# Patient Record
Sex: FEMALE | Race: WHITE | NOT HISPANIC OR LATINO | Employment: UNEMPLOYED | URBAN - METROPOLITAN AREA
[De-identification: names, ages, dates, MRNs, and addresses within clinical notes are randomized per-mention and may not be internally consistent; named-entity substitution may affect disease eponyms.]

---

## 2016-09-14 LAB
EXTERNAL ABO GROUPING: NORMAL
EXTERNAL ANTIBODY SCREEN: NEGATIVE
EXTERNAL HEPATITIS B SURFACE ANTIGEN: NEGATIVE
EXTERNAL HEPATITIS C AB: NORMAL
EXTERNAL RH FACTOR: POSITIVE
EXTERNAL RUBELLA QUALITATIVE: NORMAL
EXTERNAL SYPHILIS RPR SCREEN: NORMAL
HIV1 AB SPEC QL IA.RAPID: NEGATIVE

## 2017-03-27 LAB — EXTERNAL GROUP B STREP ANTIGEN: NORMAL

## 2017-04-28 ENCOUNTER — HOSPITAL ENCOUNTER (OUTPATIENT)
Facility: HOSPITAL | Age: 32
Setting detail: OBSERVATION
Discharge: HOME | End: 2017-04-28
Attending: OBSTETRICS & GYNECOLOGY | Admitting: OBSTETRICS & GYNECOLOGY

## 2017-04-28 VITALS
HEIGHT: 65 IN | DIASTOLIC BLOOD PRESSURE: 60 MMHG | SYSTOLIC BLOOD PRESSURE: 103 MMHG | TEMPERATURE: 98.3 F | BODY MASS INDEX: 31.32 KG/M2 | WEIGHT: 188 LBS | HEART RATE: 66 BPM

## 2017-04-28 PROBLEM — Z34.90 PREGNANCY: Status: ACTIVE | Noted: 2017-04-28

## 2017-04-28 LAB
EXPIRATION DATE: ABNORMAL
Lab: ABNORMAL
PROT UR STRIP-MCNC: ABNORMAL MG/DL

## 2017-04-28 PROCEDURE — G0378 HOSPITAL OBSERVATION PER HR: HCPCS

## 2017-04-28 PROCEDURE — 59025 FETAL NON-STRESS TEST: CPT

## 2017-04-29 ENCOUNTER — HOSPITAL ENCOUNTER (INPATIENT)
Facility: HOSPITAL | Age: 32
LOS: 3 days | Discharge: HOME | End: 2017-05-02
Attending: OBSTETRICS & GYNECOLOGY | Admitting: OBSTETRICS & GYNECOLOGY

## 2017-04-29 LAB
ABO GROUP BLD: NORMAL
BASOPHILS # BLD AUTO: 0.01 10*3/MM3 (ref 0–0.2)
BASOPHILS NFR BLD AUTO: 0.1 % (ref 0–1.5)
BLD GP AB SCN SERPL QL: NEGATIVE
DEPRECATED RDW RBC AUTO: 45.1 FL (ref 37–54)
EOSINOPHIL # BLD AUTO: 0.06 10*3/MM3 (ref 0–0.7)
EOSINOPHIL NFR BLD AUTO: 0.6 % (ref 0.3–6.2)
ERYTHROCYTE [DISTWIDTH] IN BLOOD BY AUTOMATED COUNT: 13.8 % (ref 11.7–13)
EXPIRATION DATE: NORMAL
HCT VFR BLD AUTO: 34.9 % (ref 35.6–45.5)
HGB BLD-MCNC: 11.9 G/DL (ref 11.9–15.5)
IMM GRANULOCYTES # BLD: 0.07 10*3/MM3 (ref 0–0.03)
IMM GRANULOCYTES NFR BLD: 0.8 % (ref 0–0.5)
LYMPHOCYTES # BLD AUTO: 2.33 10*3/MM3 (ref 0.9–4.8)
LYMPHOCYTES NFR BLD AUTO: 25.2 % (ref 19.6–45.3)
Lab: NORMAL
MCH RBC QN AUTO: 30.7 PG (ref 26.9–32)
MCHC RBC AUTO-ENTMCNC: 34.1 G/DL (ref 32.4–36.3)
MCV RBC AUTO: 89.9 FL (ref 80.5–98.2)
MONOCYTES # BLD AUTO: 0.51 10*3/MM3 (ref 0.2–1.2)
MONOCYTES NFR BLD AUTO: 5.5 % (ref 5–12)
NEUTROPHILS # BLD AUTO: 6.28 10*3/MM3 (ref 1.9–8.1)
NEUTROPHILS NFR BLD AUTO: 67.8 % (ref 42.7–76)
PLATELET # BLD AUTO: 165 10*3/MM3 (ref 140–500)
PMV BLD AUTO: 9.5 FL (ref 6–12)
PROT UR STRIP-MCNC: NORMAL MG/DL
RBC # BLD AUTO: 3.88 10*6/MM3 (ref 3.9–5.2)
RH BLD: POSITIVE
WBC NRBC COR # BLD: 9.26 10*3/MM3 (ref 4.5–10.7)

## 2017-04-29 PROCEDURE — 3E0P7GC INTRODUCTION OF OTHER THERAPEUTIC SUBSTANCE INTO FEMALE REPRODUCTIVE, VIA NATURAL OR ARTIFICIAL OPENING: ICD-10-PCS | Performed by: OBSTETRICS & GYNECOLOGY

## 2017-04-29 PROCEDURE — 85025 COMPLETE CBC W/AUTO DIFF WBC: CPT | Performed by: OBSTETRICS & GYNECOLOGY

## 2017-04-29 PROCEDURE — 86850 RBC ANTIBODY SCREEN: CPT | Performed by: OBSTETRICS & GYNECOLOGY

## 2017-04-29 PROCEDURE — 86901 BLOOD TYPING SEROLOGIC RH(D): CPT | Performed by: OBSTETRICS & GYNECOLOGY

## 2017-04-29 PROCEDURE — 63710000001 DIPHENHYDRAMINE PER 50 MG: Performed by: OBSTETRICS & GYNECOLOGY

## 2017-04-29 PROCEDURE — 86900 BLOOD TYPING SEROLOGIC ABO: CPT | Performed by: OBSTETRICS & GYNECOLOGY

## 2017-04-29 RX ORDER — OXYCODONE HYDROCHLORIDE AND ACETAMINOPHEN 5; 325 MG/1; MG/1
1 TABLET ORAL EVERY 4 HOURS PRN
Status: DISCONTINUED | OUTPATIENT
Start: 2017-04-29 | End: 2017-05-01 | Stop reason: HOSPADM

## 2017-04-29 RX ORDER — TERBUTALINE SULFATE 1 MG/ML
0.25 INJECTION, SOLUTION SUBCUTANEOUS AS NEEDED
Status: DISCONTINUED | OUTPATIENT
Start: 2017-04-29 | End: 2017-05-01 | Stop reason: HOSPADM

## 2017-04-29 RX ORDER — OXYCODONE HYDROCHLORIDE AND ACETAMINOPHEN 5; 325 MG/1; MG/1
2 TABLET ORAL EVERY 4 HOURS PRN
Status: DISCONTINUED | OUTPATIENT
Start: 2017-04-29 | End: 2017-05-01 | Stop reason: HOSPADM

## 2017-04-29 RX ORDER — ONDANSETRON 4 MG/1
4 TABLET, FILM COATED ORAL EVERY 6 HOURS PRN
Status: DISCONTINUED | OUTPATIENT
Start: 2017-04-29 | End: 2017-05-01 | Stop reason: HOSPADM

## 2017-04-29 RX ORDER — ACETAMINOPHEN 500 MG
1000 TABLET ORAL EVERY 4 HOURS PRN
Status: DISCONTINUED | OUTPATIENT
Start: 2017-04-29 | End: 2017-05-01 | Stop reason: HOSPADM

## 2017-04-29 RX ORDER — FAMOTIDINE 20 MG/1
20 TABLET, FILM COATED ORAL 2 TIMES DAILY
Status: DISCONTINUED | OUTPATIENT
Start: 2017-04-29 | End: 2017-05-01 | Stop reason: HOSPADM

## 2017-04-29 RX ORDER — LIDOCAINE HYDROCHLORIDE 10 MG/ML
5 INJECTION, SOLUTION INFILTRATION; PERINEURAL AS NEEDED
Status: DISCONTINUED | OUTPATIENT
Start: 2017-04-29 | End: 2017-05-01 | Stop reason: HOSPADM

## 2017-04-29 RX ORDER — FAMOTIDINE 10 MG/ML
20 INJECTION, SOLUTION INTRAVENOUS 2 TIMES DAILY
Status: DISCONTINUED | OUTPATIENT
Start: 2017-04-29 | End: 2017-05-01 | Stop reason: HOSPADM

## 2017-04-29 RX ORDER — ZOLPIDEM TARTRATE 5 MG/1
5 TABLET ORAL NIGHTLY PRN
Status: DISCONTINUED | OUTPATIENT
Start: 2017-04-29 | End: 2017-05-01 | Stop reason: HOSPADM

## 2017-04-29 RX ORDER — ONDANSETRON 2 MG/ML
4 INJECTION INTRAMUSCULAR; INTRAVENOUS EVERY 6 HOURS PRN
Status: DISCONTINUED | OUTPATIENT
Start: 2017-04-29 | End: 2017-05-01 | Stop reason: HOSPADM

## 2017-04-29 RX ORDER — SODIUM CHLORIDE, SODIUM LACTATE, POTASSIUM CHLORIDE, CALCIUM CHLORIDE 600; 310; 30; 20 MG/100ML; MG/100ML; MG/100ML; MG/100ML
125 INJECTION, SOLUTION INTRAVENOUS CONTINUOUS
Status: DISCONTINUED | OUTPATIENT
Start: 2017-04-29 | End: 2017-05-01

## 2017-04-29 RX ORDER — DIPHENHYDRAMINE HCL 25 MG
25 CAPSULE ORAL NIGHTLY PRN
Status: DISCONTINUED | OUTPATIENT
Start: 2017-04-29 | End: 2017-05-01 | Stop reason: HOSPADM

## 2017-04-29 RX ORDER — DIPHENHYDRAMINE HYDROCHLORIDE 50 MG/ML
25 INJECTION INTRAMUSCULAR; INTRAVENOUS NIGHTLY PRN
Status: DISCONTINUED | OUTPATIENT
Start: 2017-04-29 | End: 2017-05-01 | Stop reason: HOSPADM

## 2017-04-29 RX ORDER — ONDANSETRON 4 MG/1
4 TABLET, ORALLY DISINTEGRATING ORAL EVERY 6 HOURS PRN
Status: DISCONTINUED | OUTPATIENT
Start: 2017-04-29 | End: 2017-05-01 | Stop reason: HOSPADM

## 2017-04-29 RX ORDER — SODIUM CHLORIDE 0.9 % (FLUSH) 0.9 %
1-10 SYRINGE (ML) INJECTION AS NEEDED
Status: DISCONTINUED | OUTPATIENT
Start: 2017-04-29 | End: 2017-05-01 | Stop reason: HOSPADM

## 2017-04-29 RX ADMIN — DIPHENHYDRAMINE HYDROCHLORIDE 25 MG: 25 CAPSULE ORAL at 23:21

## 2017-04-29 RX ADMIN — FAMOTIDINE 20 MG: 20 TABLET, FILM COATED ORAL at 23:21

## 2017-04-29 RX ADMIN — DINOPROSTONE 10 MG: 10 INSERT VAGINAL at 21:20

## 2017-04-30 ENCOUNTER — ANESTHESIA (OUTPATIENT)
Dept: LABOR AND DELIVERY | Facility: HOSPITAL | Age: 32
End: 2017-04-30

## 2017-04-30 ENCOUNTER — ANESTHESIA EVENT (OUTPATIENT)
Dept: LABOR AND DELIVERY | Facility: HOSPITAL | Age: 32
End: 2017-04-30

## 2017-04-30 PROCEDURE — 25010000002 ROPIVACAINE PER 1 MG

## 2017-04-30 PROCEDURE — C1755 CATHETER, INTRASPINAL: HCPCS | Performed by: ANESTHESIOLOGY

## 2017-04-30 PROCEDURE — 3E033VJ INTRODUCTION OF OTHER HORMONE INTO PERIPHERAL VEIN, PERCUTANEOUS APPROACH: ICD-10-PCS | Performed by: OBSTETRICS & GYNECOLOGY

## 2017-04-30 PROCEDURE — 10907ZC DRAINAGE OF AMNIOTIC FLUID, THERAPEUTIC FROM PRODUCTS OF CONCEPTION, VIA NATURAL OR ARTIFICIAL OPENING: ICD-10-PCS | Performed by: OBSTETRICS & GYNECOLOGY

## 2017-04-30 PROCEDURE — C1755 CATHETER, INTRASPINAL: HCPCS

## 2017-04-30 RX ORDER — ROPIVACAINE HYDROCHLORIDE 2 MG/ML
INJECTION, SOLUTION EPIDURAL; INFILTRATION; PERINEURAL
Status: COMPLETED
Start: 2017-04-30 | End: 2017-04-30

## 2017-04-30 RX ORDER — CARBOPROST TROMETHAMINE 250 UG/ML
250 INJECTION, SOLUTION INTRAMUSCULAR AS NEEDED
Status: DISCONTINUED | OUTPATIENT
Start: 2017-04-30 | End: 2017-05-01 | Stop reason: HOSPADM

## 2017-04-30 RX ORDER — MISOPROSTOL 200 UG/1
800 TABLET ORAL AS NEEDED
Status: DISCONTINUED | OUTPATIENT
Start: 2017-04-30 | End: 2017-05-01 | Stop reason: HOSPADM

## 2017-04-30 RX ORDER — OXYTOCIN/RINGER'S LACTATE 10/500ML
125 PLASTIC BAG, INJECTION (ML) INTRAVENOUS CONTINUOUS PRN
Status: DISCONTINUED | OUTPATIENT
Start: 2017-04-30 | End: 2017-05-01 | Stop reason: HOSPADM

## 2017-04-30 RX ORDER — OXYTOCIN/RINGER'S LACTATE 10/500ML
2 PLASTIC BAG, INJECTION (ML) INTRAVENOUS
Status: DISCONTINUED | OUTPATIENT
Start: 2017-04-30 | End: 2017-05-01

## 2017-04-30 RX ORDER — OXYTOCIN/RINGER'S LACTATE 10/500ML
999 PLASTIC BAG, INJECTION (ML) INTRAVENOUS ONCE
Status: DISCONTINUED | OUTPATIENT
Start: 2017-04-30 | End: 2017-05-01 | Stop reason: HOSPADM

## 2017-04-30 RX ORDER — DIPHENHYDRAMINE HYDROCHLORIDE 50 MG/ML
12.5 INJECTION INTRAMUSCULAR; INTRAVENOUS EVERY 8 HOURS PRN
Status: DISCONTINUED | OUTPATIENT
Start: 2017-04-30 | End: 2017-05-01 | Stop reason: HOSPADM

## 2017-04-30 RX ORDER — ONDANSETRON 2 MG/ML
4 INJECTION INTRAMUSCULAR; INTRAVENOUS ONCE AS NEEDED
Status: DISCONTINUED | OUTPATIENT
Start: 2017-04-30 | End: 2017-05-01 | Stop reason: HOSPADM

## 2017-04-30 RX ORDER — FAMOTIDINE 10 MG/ML
20 INJECTION, SOLUTION INTRAVENOUS ONCE AS NEEDED
Status: DISCONTINUED | OUTPATIENT
Start: 2017-04-30 | End: 2017-05-01 | Stop reason: HOSPADM

## 2017-04-30 RX ADMIN — ROPIVACAINE HYDROCHLORIDE 5 ML/HR: 2 INJECTION, SOLUTION EPIDURAL; INFILTRATION at 20:27

## 2017-04-30 RX ADMIN — SODIUM CHLORIDE, POTASSIUM CHLORIDE, SODIUM LACTATE AND CALCIUM CHLORIDE 125 ML/HR: 600; 310; 30; 20 INJECTION, SOLUTION INTRAVENOUS at 21:00

## 2017-04-30 RX ADMIN — SODIUM CHLORIDE, POTASSIUM CHLORIDE, SODIUM LACTATE AND CALCIUM CHLORIDE 1000 ML: 600; 310; 30; 20 INJECTION, SOLUTION INTRAVENOUS at 20:00

## 2017-04-30 RX ADMIN — OXYTOCIN 1 MILLI-UNITS/MIN: 10 INJECTION, SOLUTION INTRAMUSCULAR; INTRAVENOUS at 23:45

## 2017-05-01 PROBLEM — Z34.90 PREGNANCY: Status: RESOLVED | Noted: 2017-04-28 | Resolved: 2017-05-01

## 2017-05-01 PROCEDURE — 0HQ9XZZ REPAIR PERINEUM SKIN, EXTERNAL APPROACH: ICD-10-PCS | Performed by: OBSTETRICS & GYNECOLOGY

## 2017-05-01 RX ORDER — ONDANSETRON 2 MG/ML
4 INJECTION INTRAMUSCULAR; INTRAVENOUS EVERY 6 HOURS PRN
Status: DISCONTINUED | OUTPATIENT
Start: 2017-05-01 | End: 2017-05-02 | Stop reason: HOSPADM

## 2017-05-01 RX ORDER — ONDANSETRON 4 MG/1
4 TABLET, ORALLY DISINTEGRATING ORAL EVERY 6 HOURS PRN
Status: DISCONTINUED | OUTPATIENT
Start: 2017-05-01 | End: 2017-05-02 | Stop reason: HOSPADM

## 2017-05-01 RX ORDER — METHYLERGONOVINE MALEATE 0.2 MG/ML
200 INJECTION INTRAVENOUS AS NEEDED
Status: DISCONTINUED | OUTPATIENT
Start: 2017-05-01 | End: 2017-05-02 | Stop reason: HOSPADM

## 2017-05-01 RX ORDER — OXYCODONE HYDROCHLORIDE AND ACETAMINOPHEN 5; 325 MG/1; MG/1
2 TABLET ORAL EVERY 4 HOURS PRN
Status: DISCONTINUED | OUTPATIENT
Start: 2017-05-01 | End: 2017-05-02 | Stop reason: HOSPADM

## 2017-05-01 RX ORDER — ZOLPIDEM TARTRATE 5 MG/1
5 TABLET ORAL NIGHTLY PRN
Status: DISCONTINUED | OUTPATIENT
Start: 2017-05-01 | End: 2017-05-02 | Stop reason: HOSPADM

## 2017-05-01 RX ORDER — IBUPROFEN 800 MG/1
800 TABLET ORAL EVERY 8 HOURS
Status: DISCONTINUED | OUTPATIENT
Start: 2017-05-01 | End: 2017-05-02 | Stop reason: HOSPADM

## 2017-05-01 RX ORDER — OXYTOCIN/RINGER'S LACTATE 10/500ML
999 PLASTIC BAG, INJECTION (ML) INTRAVENOUS ONCE
Status: DISCONTINUED | OUTPATIENT
Start: 2017-05-01 | End: 2017-05-02 | Stop reason: HOSPADM

## 2017-05-01 RX ORDER — PROMETHAZINE HYDROCHLORIDE 25 MG/ML
12.5 INJECTION, SOLUTION INTRAMUSCULAR; INTRAVENOUS EVERY 6 HOURS PRN
Status: DISCONTINUED | OUTPATIENT
Start: 2017-05-01 | End: 2017-05-02 | Stop reason: HOSPADM

## 2017-05-01 RX ORDER — ONDANSETRON 4 MG/1
4 TABLET, FILM COATED ORAL EVERY 6 HOURS PRN
Status: DISCONTINUED | OUTPATIENT
Start: 2017-05-01 | End: 2017-05-02 | Stop reason: HOSPADM

## 2017-05-01 RX ORDER — MINERAL OIL
OIL (ML) MISCELLANEOUS
Status: COMPLETED
Start: 2017-05-01 | End: 2017-05-01

## 2017-05-01 RX ORDER — MINERAL OIL
30 OIL (ML) MISCELLANEOUS AS NEEDED
Status: DISCONTINUED | OUTPATIENT
Start: 2017-05-01 | End: 2017-05-01 | Stop reason: HOSPADM

## 2017-05-01 RX ORDER — OXYTOCIN/RINGER'S LACTATE 10/500ML
125 PLASTIC BAG, INJECTION (ML) INTRAVENOUS CONTINUOUS PRN
Status: ACTIVE | OUTPATIENT
Start: 2017-05-01 | End: 2017-05-02

## 2017-05-01 RX ORDER — PROMETHAZINE HYDROCHLORIDE 25 MG/ML
12.5 INJECTION, SOLUTION INTRAMUSCULAR; INTRAVENOUS EVERY 4 HOURS PRN
Status: DISCONTINUED | OUTPATIENT
Start: 2017-05-01 | End: 2017-05-02 | Stop reason: HOSPADM

## 2017-05-01 RX ORDER — OXYCODONE HYDROCHLORIDE AND ACETAMINOPHEN 5; 325 MG/1; MG/1
1 TABLET ORAL EVERY 4 HOURS PRN
Status: DISCONTINUED | OUTPATIENT
Start: 2017-05-01 | End: 2017-05-02 | Stop reason: HOSPADM

## 2017-05-01 RX ORDER — ACETAMINOPHEN 325 MG/1
650 TABLET ORAL EVERY 4 HOURS PRN
Status: DISCONTINUED | OUTPATIENT
Start: 2017-05-01 | End: 2017-05-02 | Stop reason: HOSPADM

## 2017-05-01 RX ORDER — LANOLIN 100 %
OINTMENT (GRAM) TOPICAL
Status: DISCONTINUED | OUTPATIENT
Start: 2017-05-01 | End: 2017-05-02 | Stop reason: HOSPADM

## 2017-05-01 RX ORDER — ERYTHROMYCIN 5 MG/G
OINTMENT OPHTHALMIC
Status: DISPENSED
Start: 2017-05-01 | End: 2017-05-01

## 2017-05-01 RX ORDER — PROMETHAZINE HYDROCHLORIDE 25 MG/1
25 TABLET ORAL EVERY 6 HOURS PRN
Status: DISCONTINUED | OUTPATIENT
Start: 2017-05-01 | End: 2017-05-02 | Stop reason: HOSPADM

## 2017-05-01 RX ORDER — NALBUPHINE HCL 10 MG/ML
5 AMPUL (ML) INJECTION
Status: DISCONTINUED | OUTPATIENT
Start: 2017-05-01 | End: 2017-05-02 | Stop reason: HOSPADM

## 2017-05-01 RX ORDER — PHYTONADIONE 1 MG/.5ML
INJECTION, EMULSION INTRAMUSCULAR; INTRAVENOUS; SUBCUTANEOUS
Status: DISPENSED
Start: 2017-05-01 | End: 2017-05-01

## 2017-05-01 RX ORDER — PROMETHAZINE HYDROCHLORIDE 25 MG/1
12.5 TABLET ORAL EVERY 6 HOURS PRN
Status: DISCONTINUED | OUTPATIENT
Start: 2017-05-01 | End: 2017-05-02 | Stop reason: HOSPADM

## 2017-05-01 RX ORDER — BISACODYL 10 MG
10 SUPPOSITORY, RECTAL RECTAL DAILY PRN
Status: DISCONTINUED | OUTPATIENT
Start: 2017-05-02 | End: 2017-05-02 | Stop reason: HOSPADM

## 2017-05-01 RX ORDER — OXYCODONE HYDROCHLORIDE AND ACETAMINOPHEN 5; 325 MG/1; MG/1
2 TABLET ORAL
Status: DISCONTINUED | OUTPATIENT
Start: 2017-05-01 | End: 2017-05-02 | Stop reason: HOSPADM

## 2017-05-01 RX ORDER — DIPHENHYDRAMINE HCL 25 MG
25 CAPSULE ORAL NIGHTLY PRN
Status: DISCONTINUED | OUTPATIENT
Start: 2017-05-01 | End: 2017-05-02 | Stop reason: HOSPADM

## 2017-05-01 RX ORDER — ACETAMINOPHEN 500 MG
1000 TABLET ORAL EVERY 4 HOURS PRN
Status: DISCONTINUED | OUTPATIENT
Start: 2017-05-01 | End: 2017-05-02 | Stop reason: HOSPADM

## 2017-05-01 RX ORDER — SODIUM CHLORIDE 0.9 % (FLUSH) 0.9 %
1-10 SYRINGE (ML) INJECTION AS NEEDED
Status: DISCONTINUED | OUTPATIENT
Start: 2017-05-01 | End: 2017-05-02 | Stop reason: HOSPADM

## 2017-05-01 RX ORDER — ACETAMINOPHEN 650 MG/1
650 SUPPOSITORY RECTAL EVERY 4 HOURS PRN
Status: DISCONTINUED | OUTPATIENT
Start: 2017-05-01 | End: 2017-05-02 | Stop reason: HOSPADM

## 2017-05-01 RX ORDER — OXYCODONE HYDROCHLORIDE AND ACETAMINOPHEN 5; 325 MG/1; MG/1
1 TABLET ORAL
Status: DISCONTINUED | OUTPATIENT
Start: 2017-05-01 | End: 2017-05-02 | Stop reason: HOSPADM

## 2017-05-01 RX ORDER — DOCUSATE SODIUM 100 MG/1
100 CAPSULE, LIQUID FILLED ORAL 2 TIMES DAILY
Status: DISCONTINUED | OUTPATIENT
Start: 2017-05-01 | End: 2017-05-02 | Stop reason: HOSPADM

## 2017-05-01 RX ORDER — PROMETHAZINE HYDROCHLORIDE 12.5 MG/1
12.5 SUPPOSITORY RECTAL EVERY 6 HOURS PRN
Status: DISCONTINUED | OUTPATIENT
Start: 2017-05-01 | End: 2017-05-02 | Stop reason: HOSPADM

## 2017-05-01 RX ORDER — IBUPROFEN 600 MG/1
600 TABLET ORAL EVERY 6 HOURS PRN
Status: DISCONTINUED | OUTPATIENT
Start: 2017-05-01 | End: 2017-05-02 | Stop reason: HOSPADM

## 2017-05-01 RX ORDER — DIPHENHYDRAMINE HYDROCHLORIDE 50 MG/ML
25 INJECTION INTRAMUSCULAR; INTRAVENOUS NIGHTLY PRN
Status: DISCONTINUED | OUTPATIENT
Start: 2017-05-01 | End: 2017-05-02 | Stop reason: HOSPADM

## 2017-05-01 RX ADMIN — Medication 10 ML: at 03:16

## 2017-05-01 RX ADMIN — DOCUSATE SODIUM 100 MG: 100 CAPSULE, LIQUID FILLED ORAL at 18:44

## 2017-05-01 RX ADMIN — IBUPROFEN 800 MG: 800 TABLET, FILM COATED ORAL at 06:46

## 2017-05-01 RX ADMIN — IBUPROFEN 800 MG: 800 TABLET, FILM COATED ORAL at 14:42

## 2017-05-01 RX ADMIN — EPHEDRINE SULFATE 5 MG: 50 INJECTION INTRAMUSCULAR; INTRAVENOUS; SUBCUTANEOUS at 00:25

## 2017-05-01 RX ADMIN — LIDOCAINE HYDROCHLORIDE 30 ML: 20 JELLY TOPICAL at 03:17

## 2017-05-01 RX ADMIN — OXYTOCIN 125 ML/HR: 10 INJECTION, SOLUTION INTRAMUSCULAR; INTRAVENOUS at 03:50

## 2017-05-01 RX ADMIN — OXYCODONE HYDROCHLORIDE AND ACETAMINOPHEN 1 TABLET: 5; 325 TABLET ORAL at 06:46

## 2017-05-01 RX ADMIN — IBUPROFEN 600 MG: 600 TABLET ORAL at 22:43

## 2017-05-01 RX ADMIN — DOCUSATE SODIUM 100 MG: 100 CAPSULE, LIQUID FILLED ORAL at 08:00

## 2017-05-01 RX ADMIN — BENZOCAINE AND MENTHOL: 20; .5 SPRAY TOPICAL at 08:00

## 2017-05-02 VITALS
BODY MASS INDEX: 31.56 KG/M2 | OXYGEN SATURATION: 99 % | HEART RATE: 76 BPM | WEIGHT: 189.4 LBS | TEMPERATURE: 97.6 F | SYSTOLIC BLOOD PRESSURE: 111 MMHG | RESPIRATION RATE: 16 BRPM | DIASTOLIC BLOOD PRESSURE: 68 MMHG | HEIGHT: 65 IN

## 2017-05-02 LAB
BASOPHILS # BLD AUTO: 0.02 10*3/MM3 (ref 0–0.2)
BASOPHILS NFR BLD AUTO: 0.2 % (ref 0–1.5)
DEPRECATED RDW RBC AUTO: 47 FL (ref 37–54)
EOSINOPHIL # BLD AUTO: 0.14 10*3/MM3 (ref 0–0.7)
EOSINOPHIL NFR BLD AUTO: 1.2 % (ref 0.3–6.2)
ERYTHROCYTE [DISTWIDTH] IN BLOOD BY AUTOMATED COUNT: 13.9 % (ref 11.7–13)
HCT VFR BLD AUTO: 30.9 % (ref 35.6–45.5)
HGB BLD-MCNC: 10.3 G/DL (ref 11.9–15.5)
IMM GRANULOCYTES # BLD: 0.07 10*3/MM3 (ref 0–0.03)
IMM GRANULOCYTES NFR BLD: 0.6 % (ref 0–0.5)
LYMPHOCYTES # BLD AUTO: 3.31 10*3/MM3 (ref 0.9–4.8)
LYMPHOCYTES NFR BLD AUTO: 28.2 % (ref 19.6–45.3)
MCH RBC QN AUTO: 31.1 PG (ref 26.9–32)
MCHC RBC AUTO-ENTMCNC: 33.3 G/DL (ref 32.4–36.3)
MCV RBC AUTO: 93.4 FL (ref 80.5–98.2)
MONOCYTES # BLD AUTO: 0.81 10*3/MM3 (ref 0.2–1.2)
MONOCYTES NFR BLD AUTO: 6.9 % (ref 5–12)
NEUTROPHILS # BLD AUTO: 7.38 10*3/MM3 (ref 1.9–8.1)
NEUTROPHILS NFR BLD AUTO: 62.9 % (ref 42.7–76)
PLATELET # BLD AUTO: 134 10*3/MM3 (ref 140–500)
PMV BLD AUTO: 9.7 FL (ref 6–12)
RBC # BLD AUTO: 3.31 10*6/MM3 (ref 3.9–5.2)
WBC NRBC COR # BLD: 11.73 10*3/MM3 (ref 4.5–10.7)

## 2017-05-02 PROCEDURE — 85025 COMPLETE CBC W/AUTO DIFF WBC: CPT | Performed by: OBSTETRICS & GYNECOLOGY

## 2017-05-02 RX ORDER — IBUPROFEN 800 MG/1
800 TABLET ORAL EVERY 8 HOURS
Qty: 30 TABLET | Refills: 3 | Status: SHIPPED | OUTPATIENT
Start: 2017-05-02 | End: 2017-06-11

## 2017-05-02 RX ORDER — OXYCODONE HYDROCHLORIDE AND ACETAMINOPHEN 5; 325 MG/1; MG/1
2 TABLET ORAL EVERY 4 HOURS PRN
Qty: 24 TABLET | Refills: 0 | Status: SHIPPED | OUTPATIENT
Start: 2017-05-02 | End: 2017-05-04

## 2017-05-02 RX ADMIN — MEASLES, MUMPS, AND RUBELLA VIRUS VACCINE LIVE 0.5 ML: 1000; 12500; 1000 INJECTION, POWDER, LYOPHILIZED, FOR SUSPENSION SUBCUTANEOUS at 03:42

## 2017-05-02 RX ADMIN — OXYCODONE HYDROCHLORIDE AND ACETAMINOPHEN 1 TABLET: 5; 325 TABLET ORAL at 12:40

## 2017-05-02 RX ADMIN — DOCUSATE SODIUM 100 MG: 100 CAPSULE, LIQUID FILLED ORAL at 12:40

## 2017-05-02 RX ADMIN — OXYCODONE HYDROCHLORIDE AND ACETAMINOPHEN 1 TABLET: 5; 325 TABLET ORAL at 00:22

## 2017-05-03 LAB
ATMOSPHERIC PRESS: 739.1 MMHG
BASE EXCESS BLDCOV CALC-SCNC: -12.2 MMOL/L (ref -30–30)
BDY SITE: ABNORMAL
HCO3 BLDCOV-SCNC: 17.4 MMOL/L
MODALITY: ABNORMAL
PCO2 BLDCOV: 52 MM HG (ref 35–51.3)
PH BLDCOV: 7.13 [PH] (ref 7.26–7.4)
PO2 BLDCOV: 32.4 MM HG (ref 19–39)
SAO2 % BLDCOA: 44.5 % (ref 92–99)
SAO2 % BLDCOV: ABNORMAL %

## 2017-05-03 PROCEDURE — 82803 BLOOD GASES ANY COMBINATION: CPT | Performed by: OBSTETRICS & GYNECOLOGY

## 2017-05-09 ENCOUNTER — HOSPITAL ENCOUNTER (OUTPATIENT)
Dept: LACTATION | Facility: HOSPITAL | Age: 32
Discharge: HOME | End: 2017-05-09

## 2018-01-10 ENCOUNTER — OFFICE VISIT (OUTPATIENT)
Dept: RETAIL CLINIC | Facility: CLINIC | Age: 33
End: 2018-01-10

## 2018-01-10 VITALS
SYSTOLIC BLOOD PRESSURE: 104 MMHG | TEMPERATURE: 98.2 F | OXYGEN SATURATION: 98 % | HEART RATE: 98 BPM | DIASTOLIC BLOOD PRESSURE: 71 MMHG | RESPIRATION RATE: 18 BRPM

## 2018-01-10 DIAGNOSIS — J01.00 ACUTE NON-RECURRENT MAXILLARY SINUSITIS: Primary | ICD-10-CM

## 2018-01-10 PROCEDURE — 99213 OFFICE O/P EST LOW 20 MIN: CPT | Performed by: NURSE PRACTITIONER

## 2018-01-10 RX ORDER — AMOXICILLIN 875 MG/1
875 TABLET, COATED ORAL EVERY 12 HOURS SCHEDULED
Qty: 14 TABLET | Refills: 0 | Status: SHIPPED | OUTPATIENT
Start: 2018-01-10 | End: 2018-01-17

## 2018-01-10 NOTE — PROGRESS NOTES
Subjective   Sabrina Evans is a 32 y.o. female.     Sinusitis   This is a new problem. Episode onset: 5 days ago. The problem has been gradually worsening since onset. There has been no fever. The pain is severe. Associated symptoms include congestion, coughing (nonproductive), headaches, a hoarse voice, sinus pressure and a sore throat. Pertinent negatives include no chills, diaphoresis, ear pain, neck pain, shortness of breath, sneezing or swollen glands. Past treatments include nothing.       The following portions of the patient's history were reviewed and updated as appropriate: allergies, current medications, past family history, past medical history, past social history, past surgical history and problem list.    Review of Systems   Constitutional: Positive for appetite change and fatigue. Negative for chills, diaphoresis and fever.   HENT: Positive for congestion, hoarse voice, postnasal drip, rhinorrhea, sinus pain, sinus pressure and sore throat. Negative for ear discharge, ear pain, facial swelling, hearing loss, mouth sores, nosebleeds, sneezing, trouble swallowing and voice change.    Eyes: Negative for pain, discharge, redness and itching.   Respiratory: Positive for cough (nonproductive). Negative for chest tightness, shortness of breath, wheezing and stridor.    Cardiovascular: Negative for chest pain and palpitations.   Gastrointestinal: Negative for abdominal pain, constipation, diarrhea, nausea and vomiting.   Genitourinary: Negative for decreased urine volume.   Musculoskeletal: Negative for myalgias, neck pain and neck stiffness.   Skin: Negative for rash.   Allergic/Immunologic: Negative for environmental allergies and immunocompromised state.   Neurological: Positive for headaches. Negative for dizziness, syncope and weakness.       Objective   Physical Exam   Constitutional: She is oriented to person, place, and time. She appears well-developed and well-nourished. She is cooperative.   Non-toxic appearance. She does not appear ill. No distress.   HENT:   Right Ear: Hearing, external ear and ear canal normal. Tympanic membrane is not perforated, not erythematous, not retracted and not bulging. A middle ear effusion is present.   Left Ear: Hearing, external ear and ear canal normal. Tympanic membrane is not perforated, not erythematous, not retracted and not bulging. A middle ear effusion is present.   Nose: Mucosal edema present. No rhinorrhea. Right sinus exhibits maxillary sinus tenderness and frontal sinus tenderness. Left sinus exhibits maxillary sinus tenderness and frontal sinus tenderness.   Mouth/Throat: Uvula is midline and mucous membranes are normal. No oral lesions. Oropharyngeal exudate and posterior oropharyngeal erythema present. No posterior oropharyngeal edema. Tonsils are 2+ on the right. Tonsils are 2+ on the left. No tonsillar exudate.   Eyes: Conjunctivae and lids are normal.   Cardiovascular: Normal rate, regular rhythm, S1 normal and S2 normal.    Pulmonary/Chest: Effort normal and breath sounds normal.   Abdominal: Bowel sounds are normal. There is no tenderness.   Lymphadenopathy:     She has cervical adenopathy.        Right cervical: Superficial cervical adenopathy present. No deep cervical and no posterior cervical adenopathy present.       Left cervical: Superficial cervical adenopathy present. No deep cervical and no posterior cervical adenopathy present.   Neurological: She is alert and oriented to person, place, and time.   Skin: Skin is warm and dry. She is not diaphoretic. No pallor.   Vitals reviewed.      Assessment/Plan   Sabrina was seen today for sinusitis.    Diagnoses and all orders for this visit:    Acute non-recurrent maxillary sinusitis  -     amoxicillin (AMOXIL) 875 MG tablet; Take 1 tablet by mouth Every 12 (Twelve) Hours for 7 days.             -     Saline nasal spray PRN        -     Follow up with PCP or urgent treatment for persistent or  worsening symptoms

## 2021-04-16 ENCOUNTER — BULK ORDERING (OUTPATIENT)
Dept: CASE MANAGEMENT | Facility: OTHER | Age: 36
End: 2021-04-16

## 2021-04-16 DIAGNOSIS — Z23 IMMUNIZATION DUE: ICD-10-CM

## 2022-04-22 LAB
EXTERNAL ABO GROUPING: NORMAL
EXTERNAL ANTIBODY SCREEN: NEGATIVE
EXTERNAL HEPATITIS B SURFACE ANTIGEN: NEGATIVE
EXTERNAL HEPATITIS C AB: NORMAL
EXTERNAL RH FACTOR: POSITIVE
EXTERNAL RUBELLA QUALITATIVE: NORMAL
EXTERNAL SYPHILIS RPR SCREEN: NORMAL
HIV1 P24 AG SERPL QL IA: NEGATIVE

## 2022-06-22 ENCOUNTER — HOSPITAL ENCOUNTER (OUTPATIENT)
Facility: HOSPITAL | Age: 37
Setting detail: OBSERVATION
Discharge: HOME | End: 2022-06-22
Attending: OBSTETRICS & GYNECOLOGY | Admitting: OBSTETRICS & GYNECOLOGY

## 2022-06-22 ENCOUNTER — APPOINTMENT (OUTPATIENT)
Dept: ULTRASOUND IMAGING | Facility: HOSPITAL | Age: 37
End: 2022-06-22

## 2022-06-22 VITALS
BODY MASS INDEX: 31.52 KG/M2 | DIASTOLIC BLOOD PRESSURE: 66 MMHG | OXYGEN SATURATION: 100 % | RESPIRATION RATE: 16 BRPM | SYSTOLIC BLOOD PRESSURE: 122 MMHG | TEMPERATURE: 98.3 F | HEIGHT: 65 IN | HEART RATE: 91 BPM

## 2022-06-22 PROBLEM — Z34.90 PREGNANCY: Status: ACTIVE | Noted: 2022-06-22

## 2022-06-22 PROBLEM — N20.0 KIDNEY STONES: Status: ACTIVE | Noted: 2022-06-22

## 2022-06-22 PROBLEM — R10.9 FLANK PAIN: Status: ACTIVE | Noted: 2022-06-22

## 2022-06-22 LAB
DEPRECATED RDW RBC AUTO: 41.4 FL (ref 37–54)
ERYTHROCYTE [DISTWIDTH] IN BLOOD BY AUTOMATED COUNT: 12.9 % (ref 12.3–15.4)
HCT VFR BLD AUTO: 31.9 % (ref 34–46.6)
HGB BLD-MCNC: 11 G/DL (ref 12–15.9)
MCH RBC QN AUTO: 30.6 PG (ref 26.6–33)
MCHC RBC AUTO-ENTMCNC: 34.5 G/DL (ref 31.5–35.7)
MCV RBC AUTO: 88.9 FL (ref 79–97)
PLATELET # BLD AUTO: 206 10*3/MM3 (ref 140–450)
PMV BLD AUTO: 9.2 FL (ref 6–12)
RBC # BLD AUTO: 3.59 10*6/MM3 (ref 3.77–5.28)
SARS-COV-2 RNA RESP QL NAA+PROBE: NOT DETECTED
WBC NRBC COR # BLD: 8.37 10*3/MM3 (ref 3.4–10.8)

## 2022-06-22 PROCEDURE — C9803 HOPD COVID-19 SPEC COLLECT: HCPCS

## 2022-06-22 PROCEDURE — G0378 HOSPITAL OBSERVATION PER HR: HCPCS

## 2022-06-22 PROCEDURE — 76775 US EXAM ABDO BACK WALL LIM: CPT

## 2022-06-22 PROCEDURE — 85027 COMPLETE CBC AUTOMATED: CPT | Performed by: NURSE PRACTITIONER

## 2022-06-22 PROCEDURE — U0003 INFECTIOUS AGENT DETECTION BY NUCLEIC ACID (DNA OR RNA); SEVERE ACUTE RESPIRATORY SYNDROME CORONAVIRUS 2 (SARS-COV-2) (CORONAVIRUS DISEASE [COVID-19]), AMPLIFIED PROBE TECHNIQUE, MAKING USE OF HIGH THROUGHPUT TECHNOLOGIES AS DESCRIBED BY CMS-2020-01-R: HCPCS | Performed by: NURSE PRACTITIONER

## 2022-06-22 RX ORDER — SODIUM CHLORIDE, SODIUM LACTATE, POTASSIUM CHLORIDE, CALCIUM CHLORIDE 600; 310; 30; 20 MG/100ML; MG/100ML; MG/100ML; MG/100ML
125 INJECTION, SOLUTION INTRAVENOUS CONTINUOUS
Status: DISCONTINUED | OUTPATIENT
Start: 2022-06-23 | End: 2022-06-22 | Stop reason: HOSPADM

## 2022-06-22 RX ORDER — TAMSULOSIN HYDROCHLORIDE 0.4 MG/1
0.4 CAPSULE ORAL DAILY
Qty: 5 CAPSULE | Refills: 0 | Status: SHIPPED | OUTPATIENT
Start: 2022-06-22 | End: 2022-11-09 | Stop reason: HOSPADM

## 2022-06-22 RX ORDER — ASPIRIN 81 MG/1
81 TABLET ORAL DAILY
Status: ON HOLD | COMMUNITY
End: 2022-11-07

## 2022-06-22 RX ORDER — SODIUM CHLORIDE, SODIUM LACTATE, POTASSIUM CHLORIDE, CALCIUM CHLORIDE 600; 310; 30; 20 MG/100ML; MG/100ML; MG/100ML; MG/100ML
500 INJECTION, SOLUTION INTRAVENOUS CONTINUOUS
Status: DISCONTINUED | OUTPATIENT
Start: 2022-06-22 | End: 2022-06-22 | Stop reason: HOSPADM

## 2022-06-22 RX ORDER — PRENATAL VIT NO.126/IRON/FOLIC 28MG-0.8MG
TABLET ORAL DAILY
COMMUNITY

## 2022-06-22 RX ORDER — TAMSULOSIN HYDROCHLORIDE 0.4 MG/1
0.4 CAPSULE ORAL DAILY
Status: DISCONTINUED | OUTPATIENT
Start: 2022-06-22 | End: 2022-06-22 | Stop reason: HOSPADM

## 2022-06-22 RX ORDER — IBUPROFEN 800 MG/1
800 TABLET ORAL ONCE
Status: COMPLETED | OUTPATIENT
Start: 2022-06-22 | End: 2022-06-22

## 2022-06-22 RX ADMIN — IBUPROFEN 800 MG: 800 TABLET, FILM COATED ORAL at 20:06

## 2022-06-22 RX ADMIN — SODIUM CHLORIDE, POTASSIUM CHLORIDE, SODIUM LACTATE AND CALCIUM CHLORIDE 500 ML/HR: 600; 310; 30; 20 INJECTION, SOLUTION INTRAVENOUS at 14:20

## 2022-06-23 NOTE — DISCHARGE SUMMARY
.[facility]  Obstetric History and Physical    Chief Complaint   Patient presents with   • Back Pain     Pt sent from office for left flank pain, rule out kidney stones. Denies ob complaints. Reports good fetal movement.       Subjective     Patient is a 36 y.o. female  currently at 20w4d, who presents from the office with left flank pain for last 1-2 days. Long hx kid stones. usu passes but this time has not.     Declines narcotic pain meds. Family hx of opiate addiction.     Hungry and ready to go home.     PROBLEM LIST    Pregnancy    Flank pain    Kidney stones      Past OB History:       OB History    Para Term  AB Living   4 1 1 0 2 1   SAB IAB Ectopic Molar Multiple Live Births   2 0 0 0 0 1      # Outcome Date GA Lbr Stiven/2nd Weight Sex Delivery Anes PTL Lv   4 Current            3 SAB 2021 7w3d    SAB      2 Term 17 41w1d 15:50 / 00:53 3768 g (8 lb 4.9 oz) F Vag-Spont EPI N AMBROCIO      Birth Comments: scale #1      Name: ROSE MARIE HASSAN      Apgar1: 9  Apgar5: 9   1 SAB                Past Medical History: Past Medical History:   Diagnosis Date   • Asthma       Past Surgical History No past surgical history on file.   Family History: Family History   Problem Relation Age of Onset   • Hypertension Mother    • No Known Problems Father    • No Known Problems Sister    • No Known Problems Brother    • No Known Problems Son    • No Known Problems Daughter    • No Known Problems Maternal Grandmother    • No Known Problems Maternal Grandfather    • No Known Problems Paternal Grandmother    • No Known Problems Paternal Grandfather    • No Known Problems Cousin       Social History:  reports that she has never smoked. She has never used smokeless tobacco.   reports no history of alcohol use.   reports no history of drug use.    Allergies:     Patient has no known allergies.       Objective       Vital Signs Range for the last 24 hours  Temperature: Temp:  [98.3 °F (36.8 °C)] 98.3 °F  (36.8 °C)   Temp Source: Temp src: Oral   BP: BP: (122)/(66) 122/66   Pulse: Heart Rate:  [91] 91   Respirations: Resp:  [16] 16                   Physical Examination:     General :  Alert in NAD  Abdomen: Gravid, nontender        Left CVAT                    Fetal Heart Rate Assessment   Method: Fetal HR Assessment Method: intermittent auscultation, using Doppler   Beats/min: Fetal HR (beats/min): 145   Baseline:     Varibility:     Accels:     Decels:     Tracing Category:         Renal u/s  Mild left hydronephrosis. There is some questionable noncalcified  echogenic debris or stones in the left renal collecting system measured  as 8 and 9 mm. Bilateral bladder jets visualized.          Assessment & Plan       Assessment:  1.  Intrauterine pregnancy at 20w4d weeks gestation with reassuring fetal status.    2.  Left flank pain prob due to kid stones - counseled. Declines narcotic pain meds. Wants to go home. Motrin/ flomax. D/w can take motrin 800mg q 8 hrs for 48hrs. Keep appt next wk. Notify if worse. Can always come back for iv pain meds if needed.    Plan:   Plan of care has been reviewed with patient and family,.   All questions answered.          Office prenatal reviewed        Yu Singleton MD  6/22/2022  22:33 EDT

## 2022-10-12 LAB — EXTERNAL GROUP B STREP ANTIGEN: NORMAL

## 2022-11-07 ENCOUNTER — HOSPITAL ENCOUNTER (INPATIENT)
Dept: LABOR AND DELIVERY | Facility: HOSPITAL | Age: 37
Discharge: HOME | End: 2022-11-07

## 2022-11-07 ENCOUNTER — ANESTHESIA EVENT (OUTPATIENT)
Dept: LABOR AND DELIVERY | Facility: HOSPITAL | Age: 37
End: 2022-11-07

## 2022-11-07 ENCOUNTER — ANESTHESIA (OUTPATIENT)
Dept: LABOR AND DELIVERY | Facility: HOSPITAL | Age: 37
End: 2022-11-07

## 2022-11-07 ENCOUNTER — HOSPITAL ENCOUNTER (INPATIENT)
Facility: HOSPITAL | Age: 37
LOS: 2 days | Discharge: HOME | End: 2022-11-09
Attending: OBSTETRICS & GYNECOLOGY | Admitting: OBSTETRICS & GYNECOLOGY

## 2022-11-07 PROBLEM — Z34.90 PREGNANT: Status: ACTIVE | Noted: 2022-11-07

## 2022-11-07 PROBLEM — O09.529 AMA (ADVANCED MATERNAL AGE) MULTIGRAVIDA 35+: Status: ACTIVE | Noted: 2022-11-07

## 2022-11-07 PROBLEM — Z3A.40 40 WEEKS GESTATION OF PREGNANCY: Status: ACTIVE | Noted: 2022-11-07

## 2022-11-07 LAB
ABO GROUP BLD: NORMAL
BLD GP AB SCN SERPL QL: NEGATIVE
DEPRECATED RDW RBC AUTO: 43.4 FL (ref 37–54)
ERYTHROCYTE [DISTWIDTH] IN BLOOD BY AUTOMATED COUNT: 13.2 % (ref 12.3–15.4)
HCT VFR BLD AUTO: 36.9 % (ref 34–46.6)
HGB BLD-MCNC: 12.6 G/DL (ref 12–15.9)
MCH RBC QN AUTO: 30.9 PG (ref 26.6–33)
MCHC RBC AUTO-ENTMCNC: 34.1 G/DL (ref 31.5–35.7)
MCV RBC AUTO: 90.4 FL (ref 79–97)
PLATELET # BLD AUTO: 176 10*3/MM3 (ref 140–450)
PMV BLD AUTO: 9 FL (ref 6–12)
RBC # BLD AUTO: 4.08 10*6/MM3 (ref 3.77–5.28)
RH BLD: POSITIVE
SARS-COV-2 RNA PNL SPEC NAA+PROBE: NOT DETECTED
T&S EXPIRATION DATE: NORMAL
WBC NRBC COR # BLD: 8.01 10*3/MM3 (ref 3.4–10.8)

## 2022-11-07 PROCEDURE — 86901 BLOOD TYPING SEROLOGIC RH(D): CPT | Performed by: OBSTETRICS & GYNECOLOGY

## 2022-11-07 PROCEDURE — C1755 CATHETER, INTRASPINAL: HCPCS | Performed by: STUDENT IN AN ORGANIZED HEALTH CARE EDUCATION/TRAINING PROGRAM

## 2022-11-07 PROCEDURE — 25010000002 PENICILLIN G POTASSIUM PER 600000 UNITS: Performed by: OBSTETRICS & GYNECOLOGY

## 2022-11-07 PROCEDURE — 3E033VJ INTRODUCTION OF OTHER HORMONE INTO PERIPHERAL VEIN, PERCUTANEOUS APPROACH: ICD-10-PCS | Performed by: STUDENT IN AN ORGANIZED HEALTH CARE EDUCATION/TRAINING PROGRAM

## 2022-11-07 PROCEDURE — 25010000002 ROPIVACAINE PER 1 MG: Performed by: STUDENT IN AN ORGANIZED HEALTH CARE EDUCATION/TRAINING PROGRAM

## 2022-11-07 PROCEDURE — 87635 SARS-COV-2 COVID-19 AMP PRB: CPT | Performed by: OBSTETRICS & GYNECOLOGY

## 2022-11-07 PROCEDURE — 88307 TISSUE EXAM BY PATHOLOGIST: CPT

## 2022-11-07 PROCEDURE — 10907ZC DRAINAGE OF AMNIOTIC FLUID, THERAPEUTIC FROM PRODUCTS OF CONCEPTION, VIA NATURAL OR ARTIFICIAL OPENING: ICD-10-PCS | Performed by: STUDENT IN AN ORGANIZED HEALTH CARE EDUCATION/TRAINING PROGRAM

## 2022-11-07 PROCEDURE — 85027 COMPLETE CBC AUTOMATED: CPT | Performed by: OBSTETRICS & GYNECOLOGY

## 2022-11-07 PROCEDURE — 86900 BLOOD TYPING SEROLOGIC ABO: CPT | Performed by: OBSTETRICS & GYNECOLOGY

## 2022-11-07 PROCEDURE — 86850 RBC ANTIBODY SCREEN: CPT | Performed by: OBSTETRICS & GYNECOLOGY

## 2022-11-07 PROCEDURE — 0UQMXZZ REPAIR VULVA, EXTERNAL APPROACH: ICD-10-PCS | Performed by: STUDENT IN AN ORGANIZED HEALTH CARE EDUCATION/TRAINING PROGRAM

## 2022-11-07 RX ORDER — OXYTOCIN/0.9 % SODIUM CHLORIDE 30/500 ML
999 PLASTIC BAG, INJECTION (ML) INTRAVENOUS ONCE
Status: COMPLETED | OUTPATIENT
Start: 2022-11-07 | End: 2022-11-07

## 2022-11-07 RX ORDER — TERBUTALINE SULFATE 1 MG/ML
0.25 INJECTION, SOLUTION SUBCUTANEOUS AS NEEDED
Status: DISCONTINUED | OUTPATIENT
Start: 2022-11-07 | End: 2022-11-07 | Stop reason: HOSPADM

## 2022-11-07 RX ORDER — SODIUM CHLORIDE 0.9 % (FLUSH) 0.9 %
10 SYRINGE (ML) INJECTION EVERY 12 HOURS SCHEDULED
Status: DISCONTINUED | OUTPATIENT
Start: 2022-11-07 | End: 2022-11-07

## 2022-11-07 RX ORDER — FAMOTIDINE 10 MG/ML
20 INJECTION, SOLUTION INTRAVENOUS ONCE AS NEEDED
Status: DISCONTINUED | OUTPATIENT
Start: 2022-11-07 | End: 2022-11-07 | Stop reason: HOSPADM

## 2022-11-07 RX ORDER — FENTANYL CIT 0.2 MG/100ML-ROPIV 0.2%-NACL 0.9% EPIDURAL INJ 2/0.2 MCG/ML-%
10 SOLUTION INJECTION CONTINUOUS
Status: DISCONTINUED | OUTPATIENT
Start: 2022-11-07 | End: 2022-11-07

## 2022-11-07 RX ORDER — PRENATAL VIT/IRON FUM/FOLIC AC 27MG-0.8MG
1 TABLET ORAL DAILY
Status: DISCONTINUED | OUTPATIENT
Start: 2022-11-08 | End: 2022-11-09 | Stop reason: HOSPADM

## 2022-11-07 RX ORDER — PENICILLIN G 3000000 [IU]/50ML
3 INJECTION, SOLUTION INTRAVENOUS EVERY 4 HOURS
Status: DISCONTINUED | OUTPATIENT
Start: 2022-11-07 | End: 2022-11-07 | Stop reason: HOSPADM

## 2022-11-07 RX ORDER — ROPIVACAINE HYDROCHLORIDE 2 MG/ML
INJECTION, SOLUTION EPIDURAL; INFILTRATION; PERINEURAL AS NEEDED
Status: DISCONTINUED | OUTPATIENT
Start: 2022-11-07 | End: 2022-11-07 | Stop reason: SURG

## 2022-11-07 RX ORDER — ACETAMINOPHEN 500 MG
500 TABLET ORAL EVERY 6 HOURS
Status: DISCONTINUED | OUTPATIENT
Start: 2022-11-07 | End: 2022-11-09 | Stop reason: HOSPADM

## 2022-11-07 RX ORDER — ONDANSETRON 4 MG/1
4 TABLET, FILM COATED ORAL EVERY 6 HOURS PRN
Status: DISCONTINUED | OUTPATIENT
Start: 2022-11-07 | End: 2022-11-07 | Stop reason: HOSPADM

## 2022-11-07 RX ORDER — MISOPROSTOL 200 UG/1
800 TABLET ORAL ONCE AS NEEDED
Status: DISCONTINUED | OUTPATIENT
Start: 2022-11-07 | End: 2022-11-07 | Stop reason: HOSPADM

## 2022-11-07 RX ORDER — SODIUM CHLORIDE 0.9 % (FLUSH) 0.9 %
10 SYRINGE (ML) INJECTION AS NEEDED
Status: DISCONTINUED | OUTPATIENT
Start: 2022-11-07 | End: 2022-11-07

## 2022-11-07 RX ORDER — SODIUM CHLORIDE 0.9 % (FLUSH) 0.9 %
1-10 SYRINGE (ML) INJECTION AS NEEDED
Status: DISCONTINUED | OUTPATIENT
Start: 2022-11-07 | End: 2022-11-09 | Stop reason: HOSPADM

## 2022-11-07 RX ORDER — OXYTOCIN/0.9 % SODIUM CHLORIDE 30/500 ML
2-20 PLASTIC BAG, INJECTION (ML) INTRAVENOUS
Status: DISCONTINUED | OUTPATIENT
Start: 2022-11-07 | End: 2022-11-07 | Stop reason: HOSPADM

## 2022-11-07 RX ORDER — SODIUM CHLORIDE, SODIUM LACTATE, POTASSIUM CHLORIDE, CALCIUM CHLORIDE 600; 310; 30; 20 MG/100ML; MG/100ML; MG/100ML; MG/100ML
125 INJECTION, SOLUTION INTRAVENOUS CONTINUOUS
Status: DISCONTINUED | OUTPATIENT
Start: 2022-11-07 | End: 2022-11-07

## 2022-11-07 RX ORDER — LIDOCAINE HYDROCHLORIDE AND EPINEPHRINE 15; 5 MG/ML; UG/ML
INJECTION, SOLUTION EPIDURAL AS NEEDED
Status: DISCONTINUED | OUTPATIENT
Start: 2022-11-07 | End: 2022-11-07 | Stop reason: SURG

## 2022-11-07 RX ORDER — OXYTOCIN/0.9 % SODIUM CHLORIDE 30/500 ML
250 PLASTIC BAG, INJECTION (ML) INTRAVENOUS CONTINUOUS
Status: ACTIVE | OUTPATIENT
Start: 2022-11-07 | End: 2022-11-07

## 2022-11-07 RX ORDER — CALCIUM CARBONATE 200(500)MG
2 TABLET,CHEWABLE ORAL 3 TIMES DAILY PRN
Status: DISCONTINUED | OUTPATIENT
Start: 2022-11-07 | End: 2022-11-09 | Stop reason: HOSPADM

## 2022-11-07 RX ORDER — METHYLERGONOVINE MALEATE 0.2 MG/ML
200 INJECTION INTRAVENOUS ONCE AS NEEDED
Status: DISCONTINUED | OUTPATIENT
Start: 2022-11-07 | End: 2022-11-07 | Stop reason: HOSPADM

## 2022-11-07 RX ORDER — EPHEDRINE SULFATE 50 MG/ML
5 INJECTION, SOLUTION INTRAVENOUS AS NEEDED
Status: DISCONTINUED | OUTPATIENT
Start: 2022-11-07 | End: 2022-11-07 | Stop reason: HOSPADM

## 2022-11-07 RX ORDER — HYDROCORTISONE 25 MG/G
1 CREAM TOPICAL AS NEEDED
Status: DISCONTINUED | OUTPATIENT
Start: 2022-11-07 | End: 2022-11-09 | Stop reason: HOSPADM

## 2022-11-07 RX ORDER — CARBOPROST TROMETHAMINE 250 UG/ML
250 INJECTION, SOLUTION INTRAMUSCULAR
Status: DISCONTINUED | OUTPATIENT
Start: 2022-11-07 | End: 2022-11-07 | Stop reason: HOSPADM

## 2022-11-07 RX ORDER — BISACODYL 10 MG
10 SUPPOSITORY, RECTAL RECTAL DAILY PRN
Status: DISCONTINUED | OUTPATIENT
Start: 2022-11-08 | End: 2022-11-09 | Stop reason: HOSPADM

## 2022-11-07 RX ORDER — ONDANSETRON 2 MG/ML
4 INJECTION INTRAMUSCULAR; INTRAVENOUS ONCE AS NEEDED
Status: DISCONTINUED | OUTPATIENT
Start: 2022-11-07 | End: 2022-11-07 | Stop reason: HOSPADM

## 2022-11-07 RX ORDER — DOCUSATE SODIUM 100 MG/1
100 CAPSULE, LIQUID FILLED ORAL 2 TIMES DAILY
Status: DISCONTINUED | OUTPATIENT
Start: 2022-11-07 | End: 2022-11-09 | Stop reason: HOSPADM

## 2022-11-07 RX ORDER — OXYCODONE HYDROCHLORIDE 5 MG/1
5 TABLET ORAL EVERY 4 HOURS PRN
Status: DISCONTINUED | OUTPATIENT
Start: 2022-11-07 | End: 2022-11-09 | Stop reason: HOSPADM

## 2022-11-07 RX ORDER — DIPHENHYDRAMINE HCL 25 MG
25 CAPSULE ORAL NIGHTLY PRN
Status: DISCONTINUED | OUTPATIENT
Start: 2022-11-07 | End: 2022-11-09 | Stop reason: HOSPADM

## 2022-11-07 RX ORDER — LIDOCAINE HYDROCHLORIDE 10 MG/ML
5 INJECTION, SOLUTION EPIDURAL; INFILTRATION; INTRACAUDAL; PERINEURAL AS NEEDED
Status: DISCONTINUED | OUTPATIENT
Start: 2022-11-07 | End: 2022-11-07

## 2022-11-07 RX ORDER — ONDANSETRON 2 MG/ML
4 INJECTION INTRAMUSCULAR; INTRAVENOUS EVERY 6 HOURS PRN
Status: DISCONTINUED | OUTPATIENT
Start: 2022-11-07 | End: 2022-11-07 | Stop reason: HOSPADM

## 2022-11-07 RX ORDER — IBUPROFEN 600 MG/1
600 TABLET ORAL EVERY 6 HOURS SCHEDULED
Status: DISCONTINUED | OUTPATIENT
Start: 2022-11-08 | End: 2022-11-09 | Stop reason: HOSPADM

## 2022-11-07 RX ORDER — DIPHENHYDRAMINE HYDROCHLORIDE 50 MG/ML
12.5 INJECTION INTRAMUSCULAR; INTRAVENOUS EVERY 8 HOURS PRN
Status: DISCONTINUED | OUTPATIENT
Start: 2022-11-07 | End: 2022-11-07 | Stop reason: HOSPADM

## 2022-11-07 RX ORDER — ONDANSETRON 4 MG/1
4 TABLET, FILM COATED ORAL EVERY 8 HOURS PRN
Status: DISCONTINUED | OUTPATIENT
Start: 2022-11-07 | End: 2022-11-09 | Stop reason: HOSPADM

## 2022-11-07 RX ADMIN — PENICILLIN G POTASSIUM 5 MILLION UNITS: 5000000 INJECTION, POWDER, FOR SOLUTION INTRAMUSCULAR; INTRAVENOUS at 08:32

## 2022-11-07 RX ADMIN — SODIUM CHLORIDE, POTASSIUM CHLORIDE, SODIUM LACTATE AND CALCIUM CHLORIDE 125 ML/HR: 600; 310; 30; 20 INJECTION, SOLUTION INTRAVENOUS at 07:55

## 2022-11-07 RX ADMIN — ROPIVACAINE HYDROCHLORIDE 5 ML: 2 INJECTION, SOLUTION EPIDURAL; INFILTRATION at 12:33

## 2022-11-07 RX ADMIN — SODIUM CHLORIDE, POTASSIUM CHLORIDE, SODIUM LACTATE AND CALCIUM CHLORIDE 1000 ML: 600; 310; 30; 20 INJECTION, SOLUTION INTRAVENOUS at 12:00

## 2022-11-07 RX ADMIN — PENICILLIN G 3 MILLION UNITS: 3000000 INJECTION, SOLUTION INTRAVENOUS at 12:14

## 2022-11-07 RX ADMIN — LIDOCAINE HYDROCHLORIDE AND EPINEPHRINE 3 ML: 15; 5 INJECTION, SOLUTION EPIDURAL at 12:26

## 2022-11-07 RX ADMIN — Medication 2 MILLI-UNITS/MIN: at 08:46

## 2022-11-07 RX ADMIN — Medication 250 ML/HR: at 18:36

## 2022-11-07 RX ADMIN — PENICILLIN G 3 MILLION UNITS: 3000000 INJECTION, SOLUTION INTRAVENOUS at 16:21

## 2022-11-07 RX ADMIN — ROPIVACAINE HYDROCHLORIDE 5 ML: 2 INJECTION, SOLUTION EPIDURAL; INFILTRATION at 12:29

## 2022-11-07 RX ADMIN — EPHEDRINE SULFATE 5 MG: 50 INJECTION INTRAVENOUS at 15:05

## 2022-11-07 RX ADMIN — Medication 10 ML/HR: at 12:33

## 2022-11-07 RX ADMIN — Medication 999 ML/HR: at 17:37

## 2022-11-07 RX ADMIN — EPHEDRINE SULFATE 5 MG: 50 INJECTION INTRAVENOUS at 15:10

## 2022-11-07 NOTE — H&P
[facility]- Labor & Delivery History and Physical      Patient Name: Sabrina Evans  YOB: 1985  MRN: [id number]      Chief Complaint   Patient presents with   • Scheduled Induction       Subjective     Patient is a 37 y.o. female  currently at 40w2d, who presents for induction of labor due to advanced maternal age. Intermittently perceiving strong contractions, currently on 8u of Pitocin. Denies LOF, VB. Endorses FM    Her prenatal care is complicated by:  - Advanced Maternal Age  - Group B Strep Positive    The following portions of the patients history were reviewed and updated as appropriate: current medications, allergies, past medical history and problem list .       Prenatal Information:  External Prenatal Results     Pregnancy Outside Results - Transcribed From Office Records - See Scanned Records For Details     Test Value Date Time    ABO  A  22 0801    Rh  Positive  22 0801    Antibody Screen  Negative  22 08      ^ Negative  22     Varicella IgG       Rubella ^ Immune  22     Hgb  12.6 g/dL 22 0801       11.0 g/dL 22 1426    Hct  36.9 % 22 0801       31.9 % 22 1426    Glucose Fasting GTT       Glucose Tolerance Test 1 hour       Glucose Tolerance Test 3 hour       Gonorrhea (discrete)       Chlamydia (discrete)       RPR ^ Non-Reactive  22     VDRL       Syphilis Antibody       HBsAg ^ Negative  22     Herpes Simplex Virus PCR       Herpes Simplex VIrus Culture       HIV ^ Negative  22     Hep C RNA Quant PCR       Hep C Antibody ^ Neg  22     AFP       Group B Strep ^ POS  10/12/22     GBS Susceptibility to Clindamycin       GBS Susceptibility to Erythromycin       Fetal Fibronectin       Genetic Testing, Maternal Blood             Drug Screening     Test Value Date Time    Urine Drug Screen       Amphetamine Screen       Barbiturate Screen       Benzodiazepine Screen       Methadone  Screen       Phencyclidine Screen       Opiates Screen       THC Screen       Cocaine Screen       Propoxyphene Screen       Buprenorphine Screen       Methamphetamine Screen       Oxycodone Screen       Tricyclic Antidepressants Screen             Legend    ^: Historical                         Past OB History:     OB History    Para Term  AB Living   4 1 1 0 2 1   SAB IAB Ectopic Molar Multiple Live Births   2 0 0 0 0 1      # Outcome Date GA Lbr Stiven/2nd Weight Sex Delivery Anes PTL Lv   4 Current            3 SAB 2021 7w3d    SAB      2 Term 17 41w1d 15:50 / 00:53 3768 g (8 lb 4.9 oz) F Vag-Spont EPI N AMBROCIO      Birth Comments: scale #1      Name: ROSE MARIE HASSAN      Apgar1: 9  Apgar5: 9   1 SAB                Past Medical History: Past Medical History:   Diagnosis Date   • Asthma    • Kidney stones       Past Surgical History History reviewed. No pertinent surgical history.   Family History: Family History   Problem Relation Age of Onset   • Hypertension Mother    • No Known Problems Father    • No Known Problems Sister    • No Known Problems Brother    • No Known Problems Son    • No Known Problems Daughter    • No Known Problems Maternal Grandmother    • No Known Problems Maternal Grandfather    • No Known Problems Paternal Grandmother    • No Known Problems Paternal Grandfather    • No Known Problems Cousin       Social History:  reports that she has never smoked. She has never used smokeless tobacco.   reports no history of alcohol use.   reports no history of drug use.        General ROS:   Review of Systems   Constitutional: Negative for chills and fever.   Respiratory: Negative for cough and shortness of breath.    Cardiovascular: Negative for chest pain and palpitations.   Gastrointestinal: Negative for abdominal pain, constipation, diarrhea, nausea and vomiting.   Genitourinary: Negative for dysuria.        Objective       Vital Signs Range for the last 24 hours  Temperature:  Temp:  [98.3 °F (36.8 °C)] 98.3 °F (36.8 °C)   Temp Source: Temp src: Oral   BP: BP: ()/(60-77) 117/70   Pulse: Heart Rate:  [] 93   Respirations: Resp:  [18] 18   SPO2:     O2 Amount (l/min):     O2 Devices     Weight: Weight:  [85.7 kg (189 lb)-86.6 kg (191 lb)] 86.6 kg (191 lb)       Labs on Admission:  Results from last 7 days   Lab Units 11/07/22  0801   WBC 10*3/mm3 8.01   HEMOGLOBIN g/dL 12.6   HEMATOCRIT % 36.9   PLATELETS 10*3/mm3 176           Physical Examination:   Physical Exam  Vitals reviewed.   Constitutional:       General: She is not in acute distress.     Appearance: Normal appearance.   HENT:      Head: Normocephalic and atraumatic.   Eyes:      Extraocular Movements: Extraocular movements intact.      Pupils: Pupils are equal, round, and reactive to light.   Cardiovascular:      Rate and Rhythm: Normal rate and regular rhythm.      Pulses: Normal pulses.      Heart sounds: Normal heart sounds.   Pulmonary:      Effort: Pulmonary effort is normal.      Breath sounds: Normal breath sounds.   Abdominal:      Tenderness: There is no abdominal tenderness. There is no guarding.      Comments: Gravid; Fundal height appropriate for GA   Skin:     General: Skin is warm and dry.   Neurological:      General: No focal deficit present.      Mental Status: She is alert and oriented to person, place, and time.   Psychiatric:         Mood and Affect: Mood normal.          Presentation: Vertex   Cervix: Exam by: Method: sterile exam per physician   Dilation: Cervical Dilation (cm): 3-4   Effacement: Cervical Effacement: 70%   Station: Fetal Station: 1-->-2       Fetal Heart Rate Assessment   Method: Fetal HR Assessment Method: external   Beats/min: Fetal HR (beats/min): 140   Baseline: Fetal HR Baseline: normal range   Variability: Fetal HR Variability: moderate (amplitude range 6 to 25 bpm)   Accels: Fetal HR Accelerations: greater than/equal to 15 bpm, lasting at least 15 seconds   Decels: Fetal  HR Decelerations: absent   Tracing Category:       Uterine Assessment   Method: Method: palpation, per patient report, external tocotransducer   Frequency (min): Contraction Frequency (Minutes): 4-6   Ctx Count in 10 min:     Duration:     Intensity: Contraction Intensity: mild by palpation   Intensity by IUPC:     Resting Tone: Uterine Resting Tone: soft by palpation   Resting Tone by IUPC:     [place] Units:         Assessment & Plan       Pregnant    40 weeks gestation of pregnancy    AMA (advanced maternal age) multigravida 35+    Mother positive for group B Streptococcus colonization        1.  Intrauterine pregnancy at 40w2d weeks gestation undergoing IOL for AMA.  Reassuring fetal status.   Continue pitocin, plan for AROM after epidural. Anticipate .  Plan of care has been reviewed with patient and family.  All questions answered.    2. GBS Positive > Intrapartum PCN ordered      Ashanti Ritter MD  [facility]  2022  11:49 EST

## 2022-11-07 NOTE — PLAN OF CARE
Problem: Adult Inpatient Plan of Care  Goal: Plan of Care Review  Outcome: Ongoing, Progressing  Flowsheets (Taken 2022 184)  Progress: improving  Plan of Care Reviewed With: patient  Outcome Evaluation: , pain control breast feeding.  Goal: Patient-Specific Goal (Individualized)  Outcome: Ongoing, Progressing  Flowsheets (Taken 2022)  Patient-Specific Goals (Include Timeframe): control bleeding and pain  Individualized Care Needs: healthy baby  Anxieties, Fears or Concerns: epidural before water breaking, pain control post partum  Goal: Absence of Hospital-Acquired Illness or Injury  Outcome: Ongoing, Progressing  Intervention: Identify and Manage Fall Risk  Recent Flowsheet Documentation  Taken 2022 1612 by Emerald Wright RN  Safety Promotion/Fall Prevention:   nonskid shoes/slippers when out of bed   room organization consistent   safety round/check completed  Taken 2022 1524 by Emerald Wright RN  Safety Promotion/Fall Prevention:   safety round/check completed   lighting adjusted  Taken 2022 0930 by Emerald Wright RN  Safety Promotion/Fall Prevention:   nonskid shoes/slippers when out of bed   room organization consistent   safety round/check completed  Intervention: Prevent Skin Injury  Recent Flowsheet Documentation  Taken 2022 1800 by Emerald Wright RN  Body Position: 30 degrees  Taken 2022 1428 by Emerald Wright RN  Body Position: (side lying release) --  Intervention: Prevent and Manage VTE (Venous Thromboembolism) Risk  Recent Flowsheet Documentation  Taken 2022 1800 by Emerald Wright RN  Activity Management: bedrest  Intervention: Prevent Infection  Recent Flowsheet Documentation  Taken 2022 0930 by Emerald Wright RN  Infection Prevention:   single patient room provided   rest/sleep promoted   hand hygiene promoted  Goal: Optimal Comfort and Wellbeing  Outcome: Ongoing, Progressing  Intervention: Provide Person-Centered Care  Recent  Flowsheet Documentation  Taken 11/7/2022 1800 by Emerald Wright RN  Trust Relationship/Rapport: care explained  Taken 11/7/2022 1645 by Emerald Wright RN  Trust Relationship/Rapport: reassurance provided  Goal: Readiness for Transition of Care  Outcome: Ongoing, Progressing  Intervention: Mutually Develop Transition Plan  Recent Flowsheet Documentation  Taken 11/7/2022 0850 by Emerald Wright RN  Equipment Needed After Discharge: none  Equipment Currently Used at Home: none  Anticipated Changes Related to Illness: none  Transportation Anticipated: car, drives self  Transportation Concerns: no car  Concerns to be Addressed: no discharge needs identified  Readmission Within the Last 30 Days: no previous admission in last 30 days  Patient/Family Anticipated Services at Transition: none  Patient/Family Anticipates Transition to: home  Taken 11/7/2022 0847 by Emerald Wright RN  Equipment Currently Used at Home: none  Taken 11/7/2022 0834 by Emerald Wright RN  Equipment Currently Used at Home: none     Problem: Bleeding (Labor)  Goal: Hemostasis  Outcome: Ongoing, Progressing     Problem: Change in Fetal Wellbeing (Labor)  Goal: Stable Fetal Wellbeing  Outcome: Ongoing, Progressing  Intervention: Promote and Monitor Fetal Wellbeing  Recent Flowsheet Documentation  Taken 11/7/2022 1800 by Emerald Wright RN  Body Position: 30 degrees  Taken 11/7/2022 1428 by Emerald Wright RN  Body Position: (side lying release) --     Problem: Delayed Labor Progression (Labor)  Goal: Effective Progression to Delivery  Outcome: Ongoing, Progressing     Problem: Infection (Labor)  Goal: Absence of Infection Signs and Symptoms  Outcome: Ongoing, Progressing  Intervention: Prevent or Manage Infection  Recent Flowsheet Documentation  Taken 11/7/2022 0930 by Emerald Wright RN  Infection Prevention:   single patient room provided   rest/sleep promoted   hand hygiene promoted     Problem: Labor Pain (Labor)  Goal: Acceptable Pain  Control  Outcome: Ongoing, Progressing     Problem: Uterine Tachysystole (Labor)  Goal: Normal Uterine Contraction Pattern  Outcome: Ongoing, Progressing     Problem: Device-Related Complication Risk (Anesthesia/Analgesia, Neuraxial)  Goal: Safe Infusion Delivery Completion  Outcome: Ongoing, Progressing     Problem: Infection (Anesthesia/Analgesia, Neuraxial)  Goal: Absence of Infection Signs and Symptoms  Outcome: Ongoing, Progressing  Intervention: Prevent or Manage Infection  Recent Flowsheet Documentation  Taken 2022 0930 by Emerald Wright RN  Infection Prevention:   single patient room provided   rest/sleep promoted   hand hygiene promoted     Problem: Nausea and Vomiting (Anesthesia/Analgesia, Neuraxial)  Goal: Nausea and Vomiting Relief  Outcome: Ongoing, Progressing     Problem: Pain (Anesthesia/Analgesia, Neuraxial)  Goal: Effective Pain Control  Outcome: Ongoing, Progressing     Problem: Respiratory Compromise (Anesthesia/Analgesia, Neuraxial)  Goal: Effective Oxygenation and Ventilation  Outcome: Ongoing, Progressing     Problem: Sensorimotor Impairment (Anesthesia/Analgesia, Neuraxial)  Goal: Baseline Motor Function  Outcome: Ongoing, Progressing  Intervention: Optimize Sensorimotor Function  Recent Flowsheet Documentation  Taken 2022 1612 by Emerald Wright RN  Safety Promotion/Fall Prevention:   nonskid shoes/slippers when out of bed   room organization consistent   safety round/check completed  Taken 2022 1524 by Emerald Wright RN  Safety Promotion/Fall Prevention:   safety round/check completed   lighting adjusted  Taken 2022 0930 by Emerald Wright RN  Safety Promotion/Fall Prevention:   nonskid shoes/slippers when out of bed   room organization consistent   safety round/check completed     Problem: Urinary Retention (Anesthesia/Analgesia, Neuraxial)  Goal: Effective Urinary Elimination  Outcome: Ongoing, Progressing     Problem:  Fall Injury Risk  Goal: Absence  of Fall, Infant Drop and Related Injury  Outcome: Ongoing, Progressing  Intervention: Promote Injury-Free Environment  Recent Flowsheet Documentation  Taken 2022 1612 by Emerald Wright RN  Safety Promotion/Fall Prevention:   nonskid shoes/slippers when out of bed   room organization consistent   safety round/check completed  Taken 2022 1524 by Emerald Wright RN  Safety Promotion/Fall Prevention:   safety round/check completed   lighting adjusted  Taken 2022 0930 by Emerald Wright RN  Safety Promotion/Fall Prevention:   nonskid shoes/slippers when out of bed   room organization consistent   safety round/check completed     Problem: Fall Injury Risk  Goal: Absence of Fall and Fall-Related Injury  Outcome: Ongoing, Progressing  Intervention: Promote Injury-Free Environment  Recent Flowsheet Documentation  Taken 2022 1612 by Emerald Wright RN  Safety Promotion/Fall Prevention:   nonskid shoes/slippers when out of bed   room organization consistent   safety round/check completed  Taken 2022 1524 by Emerald Wright RN  Safety Promotion/Fall Prevention:   safety round/check completed   lighting adjusted  Taken 2022 0930 by Emerald Wright RN  Safety Promotion/Fall Prevention:   nonskid shoes/slippers when out of bed   room organization consistent   safety round/check completed     Problem: Skin Injury Risk Increased  Goal: Skin Health and Integrity  Outcome: Ongoing, Progressing   Goal Outcome Evaluation:  Plan of Care Reviewed With: patient        Progress: improving  Outcome Evaluation: , pain control breast feeding.

## 2022-11-07 NOTE — ANESTHESIA PREPROCEDURE EVALUATION
Anesthesia Evaluation     Patient summary reviewed and Nursing notes reviewed   no history of anesthetic complications:  NPO Solid Status: > 8 hours  NPO Liquid Status: > 2 hours           Airway   Mallampati: II  TM distance: >3 FB  Neck ROM: full  Dental - normal exam     Pulmonary    (+) asthma,  Cardiovascular   Exercise tolerance: good (4-7 METS)        Neuro/Psych  GI/Hepatic/Renal/Endo    (+) obesity,   renal disease stones,     Musculoskeletal     Abdominal    Substance History      OB/GYN    (+) Pregnant,         Other                        Anesthesia Plan    ASA 2     epidural     (I have reviewed the patient's history with the patient and the chart, including all pertinent laboratory results and imaging. Risks of neuraxial anesthesia were discussed with patient including but not limited to: inadequate block, bleeding, infection, persistent numbness or weakness, nerve damage, painful dysesthesia and spinal headache.    40.2)    Anesthetic plan, risks, benefits, and alternatives have been provided, discussed and informed consent has been obtained with: patient.        CODE STATUS:

## 2022-11-07 NOTE — PROGRESS NOTES
"[facility]  Labor Progress/Update Note    Patient Name: Sabrina Evans  :  1985  MRN:  [id number]      Subjective   To rm for SVE. Comfortable with epidural.       Objective     Vital Signs  /68   Pulse 93   Temp 98.5 °F (36.9 °C) (Oral)   Resp 18   Ht 165.1 cm (65\")   Wt 86.6 kg (191 lb)   SpO2 99%   Breastfeeding Yes   BMI 31.78 kg/m²       Physical Exam:  Gen: well appearing, NAD  Chest: normal resp effort  Cardio: Reg rate, well perfused  Abdomen: Soft, nontender, gravid  Extremities: No peripheral edema  SVE: 4-5/70/-1    FHT: 145/mod/+accels/-decels  [place]: ctx q4min  Pit: 10u    No intake or output data in the 24 hours ending 22 1354        Assessment & Plan      AROM performed, clear fluid noted. Cat 1 tracing. Titrate Pit PRN.      Ashanti Ritter MD  [facility]  2022  13:54 EST  "

## 2022-11-07 NOTE — L&D DELIVERY NOTE
[facility]  Vaginal Delivery Note    Patient Name: Sabrina Evans  :  1985  MRN:  [id number]          AMA (advanced maternal age) multigravida 35+    Pregnant    40 weeks gestation of pregnancy    Mother positive for group B Streptococcus colonization      Labor status: Induction of labor       Delivery     Delivery: Vaginal, Spontaneous     YOB: 2020    Time of Birth: 5:31 PM      Anesthesia: Epidural     Delivering clinician: Ashanti Ritter MD       Infant    Findings: Viable female  infant    Infant observations: Weight: No birth weight on file.   Observations/Comments:  scale 4      Apgars: 9  @ 1 minute /    9  @ 5 minutes     Placenta, Cord, and Fluid    Placenta delivered  Spontaneous   at  2022  5:36 PM     Cord: 3 vessels  present.   Cord blood obtained: Yes    Cord gases obtained:  No      Repair    Episiotomy: No   Lacerations: Small bilateral labial abrasions > reapproximated  Abrasion of the anterior cervix ( 11 o'clock) > silver nitrite applied          Estimated Blood Loss:  Quantitative Blood Loss:    350 mls.  Quantitative Blood Loss (mL): 374 mL         Delivery narrative: The patient is a 37 y.o.  at 40w2d who was admitted on 2022 for induction of labor due to advanced maternal age. Pitocin was started per protocol. Amniotomy was performed with clear fluid noted. The patient entered an active phase of labor and progressed along a normal labor curve to C/C/+1. The fetal heart tracing remained reassuring throughout the labor course.      With good maternal expulsive efforts,  of a viable female infant occurred. The head delivered atraumatically. The anterior and posterior shoulders delivered without difficulty. No nuchal cord was identified. The body was delivered atraumatically. The infant's nose and oropharynx were suctioned and cleared of secretions. Cord clamping was delayed a minimum of 30 seconds and then was clamped and cut. The infant was  placed on the mom's chest for bonding and care. Placenta delivered spontaneously, intact, with 3 vessel cord.    The vagina and perineum were inspected and two small abrasions of the bilateral labial were noted and each separately reapproximated with an interrupted stitch of 3-0 Vicryl. There was also noted to be a small abrasion on the anterior lip if the cervix to which silver nitrite was applied for hemostasis. Rectum found to be intact. Mother and baby recovering in good condition.       Ashanti Ritter MD  [facility]   11/07/22  18:17 EST

## 2022-11-07 NOTE — PROGRESS NOTES
"[facility]  Labor Progress/Update Note    Patient Name: Sabrina Evans  :  1985  MRN:  [id number]      Subjective   To rm for SVE. Patient feeling significant pressure during ctx      Objective     Vital Signs  /88   Pulse 107   Temp 98.1 °F (36.7 °C) (Oral)   Resp 18   Ht 165.1 cm (65\")   Wt 86.6 kg (191 lb)   SpO2 99%   Breastfeeding Yes   BMI 31.78 kg/m²       Physical Exam:  Gen: well appearing, NAD  Chest: normal resp effort  Cardio: Reg rate, well perfused  Abdomen: Soft, nontender, gravid  Extremities: No peripheral edema  SVE: 9.5/c/0    FHT:135/mod/+accels/+early decels  [place]: ctx q2-3min  Pit: 12u      Intake/Output Summary (Last 24 hours) at 2022 1623  Last data filed at 2022 1200  Gross per 24 hour   Intake --   Output 450 ml   Net -450 ml           Assessment & Plan      Anticipate       Ashanti Ritter MD  [facility]  2022  16:23 EST  "

## 2022-11-07 NOTE — ANESTHESIA PROCEDURE NOTES
Labor Epidural      Patient reassessed immediately prior to procedure    Patient location during procedure: OB  Start Time: 11/7/2022 12:20 PM  Stop Time: 11/7/2022 12:26 PM  Performed By  Anesthesiologist: Klever Sepulveda MD  Preanesthetic Checklist  Completed: patient identified, IV checked, site marked, risks and benefits discussed, surgical consent, monitors and equipment checked, pre-op evaluation and timeout performed  Additional Notes  Labor epidural using Arrow kit, no heme/CSF aspirated, no paraesthesias.  Test dose with 3cc 1.5% lido with epi is negative.    Prep:  Pt Position:sitting  Sterile Tech:cap, gloves, mask and sterile barrier  Prep:chlorhexidine gluconate and isopropyl alcohol  Monitoring:blood pressure monitoring, continuous pulse oximetry and EKG  Epidural Block Procedure:  Approach:midline  Guidance:landmark technique and palpation technique  Location:L4-L5  Needle Type:Tuohy  Needle Gauge:17  Loss of Resistance Medium: air  Loss of Resistance: 8cm  Cath Depth at skin:13 cm  Paresthesia: none  Aspiration:negative  Test Dose:negative  Number of Attempts: 1  Post Assessment:  Dressing:occlusive dressing applied and secured with tape  Pt Tolerance:patient tolerated the procedure well with no apparent complications  Complications:no

## 2022-11-08 LAB
BASOPHILS # BLD AUTO: 0.03 10*3/MM3 (ref 0–0.2)
BASOPHILS NFR BLD AUTO: 0.3 % (ref 0–1.5)
DEPRECATED RDW RBC AUTO: 40.8 FL (ref 37–54)
EOSINOPHIL # BLD AUTO: 0.11 10*3/MM3 (ref 0–0.4)
EOSINOPHIL NFR BLD AUTO: 1 % (ref 0.3–6.2)
ERYTHROCYTE [DISTWIDTH] IN BLOOD BY AUTOMATED COUNT: 12.7 % (ref 12.3–15.4)
HCT VFR BLD AUTO: 29.5 % (ref 34–46.6)
HGB BLD-MCNC: 10.5 G/DL (ref 12–15.9)
IMM GRANULOCYTES # BLD AUTO: 0.13 10*3/MM3 (ref 0–0.05)
IMM GRANULOCYTES NFR BLD AUTO: 1.2 % (ref 0–0.5)
LYMPHOCYTES # BLD AUTO: 2.28 10*3/MM3 (ref 0.7–3.1)
LYMPHOCYTES NFR BLD AUTO: 21.7 % (ref 19.6–45.3)
MCH RBC QN AUTO: 31.1 PG (ref 26.6–33)
MCHC RBC AUTO-ENTMCNC: 35.6 G/DL (ref 31.5–35.7)
MCV RBC AUTO: 87.3 FL (ref 79–97)
MONOCYTES # BLD AUTO: 0.68 10*3/MM3 (ref 0.1–0.9)
MONOCYTES NFR BLD AUTO: 6.5 % (ref 5–12)
NEUTROPHILS NFR BLD AUTO: 69.3 % (ref 42.7–76)
NEUTROPHILS NFR BLD AUTO: 7.27 10*3/MM3 (ref 1.7–7)
NRBC BLD AUTO-RTO: 0 /100 WBC (ref 0–0.2)
PLATELET # BLD AUTO: 168 10*3/MM3 (ref 140–450)
PMV BLD AUTO: 9 FL (ref 6–12)
RBC # BLD AUTO: 3.38 10*6/MM3 (ref 3.77–5.28)
WBC NRBC COR # BLD: 10.5 10*3/MM3 (ref 3.4–10.8)

## 2022-11-08 PROCEDURE — 85025 COMPLETE CBC W/AUTO DIFF WBC: CPT | Performed by: STUDENT IN AN ORGANIZED HEALTH CARE EDUCATION/TRAINING PROGRAM

## 2022-11-08 RX ORDER — BENZONATATE 100 MG/1
100 CAPSULE ORAL 3 TIMES DAILY PRN
Status: DISCONTINUED | OUTPATIENT
Start: 2022-11-08 | End: 2022-11-09 | Stop reason: HOSPADM

## 2022-11-08 RX ORDER — GUAIFENESIN 600 MG/1
600 TABLET, EXTENDED RELEASE ORAL EVERY 12 HOURS SCHEDULED
Status: DISCONTINUED | OUTPATIENT
Start: 2022-11-08 | End: 2022-11-09 | Stop reason: HOSPADM

## 2022-11-08 RX ADMIN — Medication 1 TABLET: at 08:25

## 2022-11-08 RX ADMIN — GUAIFENESIN 600 MG: 600 TABLET, EXTENDED RELEASE ORAL at 01:00

## 2022-11-08 RX ADMIN — ACETAMINOPHEN 500 MG: 500 TABLET ORAL at 21:07

## 2022-11-08 RX ADMIN — IBUPROFEN 600 MG: 600 TABLET, FILM COATED ORAL at 17:43

## 2022-11-08 RX ADMIN — GUAIFENESIN 600 MG: 600 TABLET, EXTENDED RELEASE ORAL at 08:25

## 2022-11-08 RX ADMIN — IBUPROFEN 600 MG: 600 TABLET, FILM COATED ORAL at 08:25

## 2022-11-08 RX ADMIN — GUAIFENESIN 600 MG: 600 TABLET, EXTENDED RELEASE ORAL at 21:07

## 2022-11-08 RX ADMIN — Medication 1 APPLICATION: at 08:57

## 2022-11-08 RX ADMIN — BENZONATATE 100 MG: 100 CAPSULE ORAL at 08:25

## 2022-11-08 RX ADMIN — BENZONATATE 100 MG: 100 CAPSULE ORAL at 16:38

## 2022-11-08 RX ADMIN — DOCUSATE SODIUM 100 MG: 100 CAPSULE, LIQUID FILLED ORAL at 21:07

## 2022-11-08 RX ADMIN — IBUPROFEN 600 MG: 600 TABLET, FILM COATED ORAL at 01:00

## 2022-11-08 RX ADMIN — DOCUSATE SODIUM 100 MG: 100 CAPSULE, LIQUID FILLED ORAL at 08:25

## 2022-11-08 RX ADMIN — BENZONATATE 100 MG: 100 CAPSULE ORAL at 01:00

## 2022-11-08 RX ADMIN — DOCUSATE SODIUM 100 MG: 100 CAPSULE, LIQUID FILLED ORAL at 01:00

## 2022-11-08 NOTE — ANESTHESIA POSTPROCEDURE EVALUATION
"Patient: Sabrina Evans    Procedure Summary     Date: 11/07/22 Room / Location:     Anesthesia Start: 1215 Anesthesia Stop: 1731    Procedure: LABOR ANALGESIA Diagnosis:     Scheduled Providers:  Provider: Klever Sepulveda MD    Anesthesia Type: epidural ASA Status: 2          Anesthesia Type: epidural    Vitals  Vitals Value Taken Time   /64 11/07/22 1945   Temp 37.1 °C (98.8 °F) 11/07/22 1730   Pulse 94 11/07/22 1945   Resp 18 11/07/22 1930   SpO2 99 % 11/07/22 1315   Vitals shown include unvalidated device data.        Post Anesthesia Care and Evaluation    Patient location during evaluation: PACU  Patient participation: complete - patient participated  Level of consciousness: awake and alert  Pain management: adequate    Airway patency: patent  Anesthetic complications: No anesthetic complications  PONV Status: controlled  Cardiovascular status: acceptable and hemodynamically stable  Respiratory status: acceptable  Hydration status: acceptable    Comments: /58   Pulse 96   Temp 37.1 °C (98.8 °F) (Oral)   Resp 18   Ht 165.1 cm (65\")   Wt 86.6 kg (191 lb)   SpO2 99%   Breastfeeding Yes   BMI 31.78 kg/m²       "

## 2022-11-08 NOTE — LACTATION NOTE
This note was copied from a baby's chart.  P2 term baby. Mom reports nipples are sore. Discussed how to achieve deeper latch on the breast vs shallow latch on the nipple. Nipples intact. Mom reports good milk supply with her first baby for the first 6 months then had to start supplementing when starting foods. She has personal pump. Baby has had one wet and 2 stools since midnight. Discussed ways to know baby is getting enough milk and call for any assistance.

## 2022-11-08 NOTE — PROGRESS NOTES
[facility]  Vaginal Delivery Progress Note    Patient Name: Sabrina Evans  :  1985  MRN:  [id number]      Subjective   Postpartum Day 1: Vaginal Delivery of a female infant.     The patient feels well without complaints.  Her pain is well controlled.  Reports normal lochia.     The patient plans to breastfeed.    Objective     Vital Signs Range for the last 24 hours  Temperature: Temp:  [98 °F (36.7 °C)-99.1 °F (37.3 °C)] 98.5 °F (36.9 °C)       BP: BP: ()/(37-88) 93/57   Pulse: Heart Rate:  [] 87   Respirations: Resp:  [16-18] 16                       Physical Exam:  General: Awake and alert  Abdomen: Fundus: firm, non tender, 1 below umbilicus  Extremities:  trace edema, NT     Labs:     Results from last 7 days   Lab Units 22  0539 22  0801   WBC 10*3/mm3 10.50 8.01   HEMOGLOBIN g/dL 10.5* 12.6   HEMATOCRIT % 29.5* 36.9   PLATELETS 10*3/mm3 168 176       Prenatal labs results reviewed:  Yes   Rubella:  Immune  Rh Status:    RH type   Date Value Ref Range Status   2022 Positive  Final         Assessment & Plan  : 1. PPD1 S/P  - Doing well, continue usual cares.           AMA (advanced maternal age) multigravida 35+    Pregnant    40 weeks gestation of pregnancy    Mother positive for group B Streptococcus colonization          Meme Mckeon, BRADY  2022  09:35 EST

## 2022-11-09 VITALS
DIASTOLIC BLOOD PRESSURE: 62 MMHG | HEIGHT: 65 IN | WEIGHT: 191 LBS | RESPIRATION RATE: 16 BRPM | HEART RATE: 77 BPM | BODY MASS INDEX: 31.82 KG/M2 | TEMPERATURE: 97.4 F | OXYGEN SATURATION: 99 % | SYSTOLIC BLOOD PRESSURE: 99 MMHG

## 2022-11-09 RX ORDER — IBUPROFEN 600 MG/1
600 TABLET ORAL EVERY 6 HOURS SCHEDULED
Qty: 60 TABLET | Refills: 0 | Status: SHIPPED | OUTPATIENT
Start: 2022-11-09

## 2022-11-09 RX ORDER — BENZONATATE 100 MG/1
100 CAPSULE ORAL 3 TIMES DAILY PRN
Qty: 15 CAPSULE | Refills: 0 | Status: SHIPPED | OUTPATIENT
Start: 2022-11-09

## 2022-11-09 RX ADMIN — DOCUSATE SODIUM 100 MG: 100 CAPSULE, LIQUID FILLED ORAL at 09:07

## 2022-11-09 RX ADMIN — GUAIFENESIN 600 MG: 600 TABLET, EXTENDED RELEASE ORAL at 09:07

## 2022-11-09 RX ADMIN — IBUPROFEN 600 MG: 600 TABLET, FILM COATED ORAL at 00:12

## 2022-11-09 RX ADMIN — ACETAMINOPHEN 500 MG: 500 TABLET ORAL at 09:06

## 2022-11-09 RX ADMIN — IBUPROFEN 600 MG: 600 TABLET, FILM COATED ORAL at 06:09

## 2022-11-09 RX ADMIN — BENZONATATE 100 MG: 100 CAPSULE ORAL at 09:08

## 2022-11-09 RX ADMIN — ACETAMINOPHEN 500 MG: 500 TABLET ORAL at 03:40

## 2022-11-09 RX ADMIN — BENZONATATE 100 MG: 100 CAPSULE ORAL at 00:12

## 2022-11-09 RX ADMIN — Medication 1 TABLET: at 09:06

## 2022-11-09 NOTE — LACTATION NOTE
This note was copied from a baby's chart.  PT is going home today. Mom reports baby is BF well.  [facility] and and mommy and Me info on the back of the educational booklet. Educated on baby's expected output and weight gain. Discussed engorgement, mastitis, pumping, milk storage, colostrum expectations and when to expect mature milk supply. PT declines any questions and concerns at this time. Encouraged to call LC if needing further assistance.

## 2022-11-09 NOTE — LACTATION NOTE
This note was copied from a baby's chart.  Mom attempting to sleep at this time, she prefers LC returns later.  Dad with baby at bedside.

## 2022-11-09 NOTE — PLAN OF CARE
Goal Outcome Evaluation:  Plan of Care Reviewed With: patient, spouse        Progress: improving  Outcome Evaluation: VSS, fundus firm, lochia WNL, pain controlled, taking prescribed meds for cough, breastfeeding and bonding with infant

## 2022-11-09 NOTE — DISCHARGE SUMMARY
Date of Discharge:  2022    Discharge Diagnosis: vaginal delivery    Presenting Problem/History of Present Illness  Pregnant [Z34.90]       Hospital Course  Patient is a 37 y.o. female presented for term IOL.  Delivered viable female infant per Dr. Ritter.  No pp complications.  Has had uri/ cough/ congestion for about 10d.  Used tessalon pearls last night which helped.  Lungs CTAB.      Procedures Performed         Consults:   Consults     No orders found from 10/9/2022 to 2022.          Condition on Discharge:   Subjective   Postpartum Day 2 Vaginal Delivery.    The patient feels well without complaints.    Vital Signs  Temp:  [97.2 °F (36.2 °C)-97.9 °F (36.6 °C)] 97.4 °F (36.3 °C)  Heart Rate:  [77-84] 77  Resp:  [16] 16  BP: ()/(56-63) 99/62    Physical Exam:   General: Awake and alert   Abdomen: Fundus: firm, non tender    Extremities:  Calves NT bilaterally    Assessment & Plan     PPD2  S/P  -   Stable for discharge. Instructions reviewed      Discharge Disposition  Home or Self Care    Discharge Medications     Discharge Medications      New Medications      Instructions Start Date   benzonatate 100 MG capsule  Commonly known as: TESSALON   100 mg, Oral, 3 Times Daily PRN      ibuprofen 600 MG tablet  Commonly known as: ADVIL,MOTRIN   600 mg, Oral, Every 6 Hours Scheduled         Continue These Medications      Instructions Start Date   ALBUTEROL IN   2 puffs, Inhalation, As Needed      fluticasone 50 MCG/ACT nasal spray  Commonly known as: FLONASE   2 sprays, Nasal, Daily      prenatal (CLASSIC) vitamin  tablet  Generic drug: prenatal vitamin   Oral, Daily         Stop These Medications    tamsulosin 0.4 MG capsule 24 hr capsule  Commonly known as: FLOMAX              The patient has been prescribed a controlled substance.  She has been counseled on the risks associated with using the medication.   The addictive potential of this medication and alternatives were discussed carefully with  this patient and she demonstrated understanding.  A FLORA report has been obtained and reviewed.       Activity at Discharge: restrictions reviewed    Follow-up Appointments  No future appointments.      Test Results Pending at Discharge       BRADY Reese  11/09/22  09:07 EST

## 2022-11-09 NOTE — PAYOR COMM NOTE
"Sabrina Hassan (37 y.o. Female)     [facility] Patricia Moon  [address] [place] [address] [place] [address]  Maya Loving - [phone]  Utilization Review/Room Reservations  Phone: [phone], [phone], [phone] or [phone]  Fax: [phone]  Email: [email or web address]  Please call, fax back, or email with authorization or any questions! Thanks!  CLINICALS FOR INPT MATERNITY   REF#[id number]    Date of Birth   1985    Social Security Number       Address   [facility] [address] [place] [facility]    Home Phone   [phone]    MRN   [id number]       Amish   Nondenominational    Marital Status                               Admission Date   11/7/22    Admission Type   Elective    Admitting Provider   Yvette Thorpe MD    Attending Provider   Yvette Thorpe MD    Department, Room/Bed   [address], E357/1       Discharge Date       Discharge Disposition   Home or Self Care    Discharge Destination                               Attending Provider: Yvette Thorpe MD    Allergies: No Known Allergies    Isolation: None   Infection: None   Code Status: CPR    Ht: 165.1 cm (65\")   Wt: 86.6 kg (191 lb)    Admission Cmt: None   Principal Problem: AMA (advanced maternal age) multigravida 35+ [O09.529]                 Active Insurance as of 11/7/2022     Primary Coverage     Payor Plan Insurance Group Employer/Plan Group    [facility] [id number]     Payor Plan Address Payor Plan Phone Number Payor Plan Fax Number Effective Dates    [address] [address]   5/31/2022 - None Entered    [facility] [id number]       Subscriber Name Subscriber Birth Date Member ID       ELIAS HASSAN 8/12/1983 [id number]                 Emergency Contacts      (Rel.) Home Phone Work Phone Mobile Phone    Elias Hassan (Spouse) [phone] -- [phone]            Insurance " Information                [employer] Phone: --    Subscriber: Louie Evans Subscriber#: [id number]    Group#: [id number] Precert#: --          Problem List           Codes Noted - Resolved       Hospital    Pregnant ICD-10-CM: Z34.90  ICD-9-CM: V22.2 2022 - Present    40 weeks gestation of pregnancy ICD-10-CM: Z3A.40  ICD-9-CM: V22.2 2022 - Present    * (Principal) AMA (advanced maternal age) multigravida 35+ ICD-10-CM: O09.529  ICD-9-CM: 659.60 2022 - Present    Mother positive for group B Streptococcus colonization ICD-10-CM: P00.82  ICD-9-CM: V29.0 2022 - Present       Non-Hospital    Pregnancy ICD-10-CM: Z34.90  ICD-9-CM: V22.2 2022 - Present    Flank pain ICD-10-CM: R10.9  ICD-9-CM: 789.09 2022 - Present    Kidney stones ICD-10-CM: N20.0  ICD-9-CM: 592.0 2022 - Present    Postpartum anemia ICD-10-CM: O90.81  ICD-9-CM: 648.24, 285.9 2017 - Present        History & Physical      Ashanti Ritter MD at 22 [address] [place] [address]- Labor & Delivery History and Physical      Patient Name: Sabrina Evans  YOB: 1985  MRN: [id number]      Chief Complaint   Patient presents with   • Scheduled Induction       Subjective      Patient is a 37 y.o. female  currently at 40w2d, who presents for induction of labor due to advanced maternal age. Intermittently perceiving strong contractions, currently on 8u of Pitocin. Denies LOF, VB. Endorses FM    Her prenatal care is complicated by:  - Advanced Maternal Age  - Group B Strep Positive    The following portions of the patients history were reviewed and updated as appropriate: current medications, allergies, past medical history and problem list .       Prenatal Information:  External Prenatal Results     Pregnancy Outside Results - Transcribed From Office Records - See Scanned Records For Details     Test Value Date Time    ABO  A  22    Rh  Positive  22     Antibody Screen  Negative  22 0801      ^ Negative  22     Varicella IgG       Rubella ^ Immune  22     Hgb  12.6 g/dL 22 0801       11.0 g/dL 22 1426    Hct  36.9 % 22 0801       31.9 % 22 1426    Glucose Fasting GTT       Glucose Tolerance Test 1 hour       Glucose Tolerance Test 3 hour       Gonorrhea (discrete)       Chlamydia (discrete)       RPR ^ Non-Reactive  22     VDRL       Syphilis Antibody       HBsAg ^ Negative  22     Herpes Simplex Virus PCR       Herpes Simplex VIrus Culture       HIV ^ Negative  22     Hep C RNA Quant PCR       Hep C Antibody ^ Neg  22     AFP       Group B Strep ^ POS  10/12/22     GBS Susceptibility to Clindamycin       GBS Susceptibility to Erythromycin       Fetal Fibronectin       Genetic Testing, Maternal Blood             Drug Screening     Test Value Date Time    Urine Drug Screen       Amphetamine Screen       Barbiturate Screen       Benzodiazepine Screen       Methadone Screen       Phencyclidine Screen       Opiates Screen       THC Screen       Cocaine Screen       Propoxyphene Screen       Buprenorphine Screen       Methamphetamine Screen       Oxycodone Screen       Tricyclic Antidepressants Screen             Legend    ^: Historical                         Past OB History:     OB History    Para Term  AB Living   4 1 1 0 2 1   SAB IAB Ectopic Molar Multiple Live Births   2 0 0 0 0 1      # Outcome Date GA Lbr Stiven/2nd Weight Sex Delivery Anes PTL Lv   4 Current            3 SAB 2021 7w3d    SAB      2 Term 17 41w1d 15:50 / 00:53 3768 g (8 lb 4.9 oz) F Vag-Spont EPI N AMBROCIO      Birth Comments: scale #1      Name: PAOLO HASSANPATRICE      Apgar1: 9  Apgar5: 9   1 SAB                Past Medical History: Past Medical History:   Diagnosis Date   • Asthma    • Kidney stones       Past Surgical History History reviewed. No pertinent surgical history.   Family History: Family History    Problem Relation Age of Onset   • Hypertension Mother    • No Known Problems Father    • No Known Problems Sister    • No Known Problems Brother    • No Known Problems Son    • No Known Problems Daughter    • No Known Problems Maternal Grandmother    • No Known Problems Maternal Grandfather    • No Known Problems Paternal Grandmother    • No Known Problems Paternal Grandfather    • No Known Problems Cousin       Social History:  reports that she has never smoked. She has never used smokeless tobacco.   reports no history of alcohol use.   reports no history of drug use.        General ROS:   Review of Systems   Constitutional: Negative for chills and fever.   Respiratory: Negative for cough and shortness of breath.    Cardiovascular: Negative for chest pain and palpitations.   Gastrointestinal: Negative for abdominal pain, constipation, diarrhea, nausea and vomiting.   Genitourinary: Negative for dysuria.        Objective        Vital Signs Range for the last 24 hours  Temperature: Temp:  [98.3 °F (36.8 °C)] 98.3 °F (36.8 °C)   Temp Source: Temp src: Oral   BP: BP: ()/(60-77) 117/70   Pulse: Heart Rate:  [] 93   Respirations: Resp:  [18] 18   SPO2:     O2 Amount (l/min):     O2 Devices     Weight: Weight:  [85.7 kg (189 lb)-86.6 kg (191 lb)] 86.6 kg (191 lb)       Labs on Admission:  Results from last 7 days   Lab Units 11/07/22  0801   WBC 10*3/mm3 8.01   HEMOGLOBIN g/dL 12.6   HEMATOCRIT % 36.9   PLATELETS 10*3/mm3 176           Physical Examination:   Physical Exam  Vitals reviewed.   Constitutional:       General: She is not in acute distress.     Appearance: Normal appearance.   HENT:      Head: Normocephalic and atraumatic.   Eyes:      Extraocular Movements: Extraocular movements intact.      Pupils: Pupils are equal, round, and reactive to light.   Cardiovascular:      Rate and Rhythm: Normal rate and regular rhythm.      Pulses: Normal pulses.      Heart sounds: Normal heart sounds.    Pulmonary:      Effort: Pulmonary effort is normal.      Breath sounds: Normal breath sounds.   Abdominal:      Tenderness: There is no abdominal tenderness. There is no guarding.      Comments: Gravid; Fundal height appropriate for GA   Skin:     General: Skin is warm and dry.   Neurological:      General: No focal deficit present.      Mental Status: She is alert and oriented to person, place, and time.   Psychiatric:         Mood and Affect: Mood normal.          Presentation: Vertex   Cervix: Exam by: Method: sterile exam per physician   Dilation: Cervical Dilation (cm): 3-4   Effacement: Cervical Effacement: 70%   Station: Fetal Station: 1-->-2       Fetal Heart Rate Assessment   Method: Fetal HR Assessment Method: external   Beats/min: Fetal HR (beats/min): 140   Baseline: Fetal HR Baseline: normal range   Variability: Fetal HR Variability: moderate (amplitude range 6 to 25 bpm)   Accels: Fetal HR Accelerations: greater than/equal to 15 bpm, lasting at least 15 seconds   Decels: Fetal HR Decelerations: absent   Tracing Category:       Uterine Assessment   Method: Method: palpation, per patient report, external tocotransducer   Frequency (min): Contraction Frequency (Minutes): 4-6   Ctx Count in 10 min:     Duration:     Intensity: Contraction Intensity: mild by palpation   Intensity by IUPC:     Resting Tone: Uterine Resting Tone: soft by palpation   Resting Tone by IUPC:     [place] Units:         Assessment & Plan       Pregnant    40 weeks gestation of pregnancy    AMA (advanced maternal age) multigravida 35+    Mother positive for group B Streptococcus colonization        1.  Intrauterine pregnancy at 40w2d weeks gestation undergoing IOL for AMA.  Reassuring fetal status.   Continue pitocin, plan for AROM after epidural. Anticipate .  Plan of care has been reviewed with patient and family.  All questions answered.    2. GBS Positive > Intrapartum PCN ordered      Ashanti Ritter MD  [facility]  11/7/2022  11:49 EST      Electronically signed by Ashanti Ritter MD at 11/07/22 1152         Facility-Administered Medications as of 11/9/2022   Medication Dose Route Frequency Provider Last Rate Last Admin   • acetaminophen (TYLENOL) tablet 500 mg  500 mg Oral Q6H Ashanti Ritter MD   500 mg at 11/09/22 0906   • all purpose nipple ointment 1 application  1 application Topical 4x Daily PRN Ashanti Ritter MD       • benzocaine (AMERICAINE) 20 % rectal ointment 1 application  1 application Rectal PRN Ashanti Ritter MD       • benzocaine-menthol (DERMOPLAST) 20-0.5 % topical spray   Topical PRN Ashanti Ritter MD       • benzonatate (TESSALON) capsule 100 mg  100 mg Oral TID PRN Ashanti Ritter MD   100 mg at 11/09/22 0908   • bisacodyl (DULCOLAX) suppository 10 mg  10 mg Rectal Daily PRN Ashanti Ritter MD       • calcium carbonate (TUMS) chewable tablet 500 mg (200 mg elemental)  2 tablet Oral TID PRN Ashanti Ritter MD       • diphenhydrAMINE (BENADRYL) capsule 25 mg  25 mg Oral Nightly PRN Ashanti Ritter MD       • docusate sodium (COLACE) capsule 100 mg  100 mg Oral BID Ashanti Ritter MD   100 mg at 11/09/22 0907   • guaiFENesin (MUCINEX) 12 hr tablet 600 mg  600 mg Oral Q12H Ashanti Ritter MD   600 mg at 11/09/22 0907   • Hydrocort-Pramoxine (Perianal) (PROCTOFOAM-HS) 1-1 % rectal foam 1 application  1 application Topical PRN Ashanti Ritter MD       • Hydrocortisone (Perianal) (ANUSOL-HC) 2.5 % rectal cream 1 application  1 application Rectal PRN Ashanti Ritter MD       • ibuprofen (ADVIL,MOTRIN) tablet 600 mg  600 mg Oral Q6H Ashanti Ritter MD   600 mg at 11/09/22 0609   • [COMPLETED] ibuprofen (ADVIL,MOTRIN) tablet 800 mg  800 mg Oral Once Yu Singleton MD   800 mg at 06/22/22 2006   • [COMPLETED] lactated ringers bolus 1,000 mL  1,000 mL Intravenous Once PRN Yvette Thorpe MD   Stopped at 11/07/22 1237   • lanolin topical 1 application  1 application Topical Q1H PRN  Ashanti Ritter MD   1 application at 22 0857   • magnesium hydroxide (MILK OF MAGNESIA) suspension 10 mL  10 mL Oral Daily PRN Ashanti Ritter MD       • ondansetron (ZOFRAN) tablet 4 mg  4 mg Oral Q8H PRN Ashanti Ritter MD       • oxyCODONE (ROXICODONE) immediate release tablet 5 mg  5 mg Oral Q4H PRN Ashanti Ritter MD       • [COMPLETED] oxytocin (PITOCIN) 30 units in 0.9% sodium chloride 500 mL (premix)  999 mL/hr Intravenous Once Yvette Thorpe  mL/hr at 22 999 mL/hr at 22    Followed by   • [] oxytocin (PITOCIN) 30 units in 0.9% sodium chloride 500 mL (premix)  250 mL/hr Intravenous Continuous Yvette Thorpe  mL/hr at 22 250 mL/hr at 22   • [COMPLETED] penicillin G potassium 5 Million Units in sodium chloride 0.9 % 100 mL IVPB-VTB  5 Million Units Intravenous Once Yvette Thorpe MD   Stopped at 22 0920   • prenatal vitamin tablet 1 tablet  1 tablet Oral Daily Ashanti Ritter MD   1 tablet at 22 0906   • sodium chloride 0.9 % flush 1-10 mL  1-10 mL Intravenous PRN Ashanti Ritter MD         Lab Results (last 48 hours)     Procedure Component Value Units Date/Time    CBC & Differential [[id number]]  (Abnormal) Collected: 22    Specimen: Blood Updated: 22    Narrative:      The following orders were created for panel order CBC & Differential.  Procedure                               Abnormality         Status                     ---------                               -----------         ------                     CBC Auto Differential[[id number]]        Abnormal            Final result                 Please view results for these tests on the individual orders.    CBC Auto Differential [[id number]]  (Abnormal) Collected: 22    Specimen: Blood Updated: 22     WBC 10.50 10*3/mm3      RBC 3.38 10*6/mm3      Hemoglobin 10.5 g/dL      Hematocrit 29.5 %      MCV 87.3 fL      MCH 31.1  pg      MCHC 35.6 g/dL      RDW 12.7 %      RDW-SD 40.8 fl      MPV 9.0 fL      Platelets 168 10*3/mm3      Neutrophil % 69.3 %      Lymphocyte % 21.7 %      Monocyte % 6.5 %      Eosinophil % 1.0 %      Basophil % 0.3 %      Immature Grans % 1.2 %      Neutrophils, Absolute 7.27 10*3/mm3      Lymphocytes, Absolute 2.28 10*3/mm3      Monocytes, Absolute 0.68 10*3/mm3      Eosinophils, Absolute 0.11 10*3/mm3      Basophils, Absolute 0.03 10*3/mm3      Immature Grans, Absolute 0.13 10*3/mm3      nRBC 0.0 /100 WBC           Imaging Results (Last 48 Hours)     ** No results found for the last 48 hours. **        ECG/EMG Results (last 48 hours)     ** No results found for the last 48 hours. **          Orders (last 48 hrs)      Start     Ordered    11/09/22 0906  Discharge patient  Once         11/09/22 0906 11/09/22 0000  ibuprofen (ADVIL,MOTRIN) 600 MG tablet  Every 6 Hours Scheduled         11/09/22 0906 11/09/22 0000  benzonatate (TESSALON) 100 MG capsule  3 Times Daily PRN         11/09/22 0906 11/08/22 0900  prenatal vitamin tablet 1 tablet  Daily         11/07/22 2037 11/08/22 0800  Sitz Bath  3 Times Daily        Comments: PRN    11/07/22 2037 11/08/22 0600  CBC & Differential  Morning Draw        Comments: Postpartum Day 1      11/07/22 2037 11/08/22 0600  CBC Auto Differential  PROCEDURE ONCE        Comments: Postpartum Day 1      11/07/22 2205 11/08/22 0130  guaiFENesin (MUCINEX) 12 hr tablet 600 mg  Every 12 Hours Scheduled         11/08/22 0030    11/08/22 0029  benzonatate (TESSALON) capsule 100 mg  3 Times Daily PRN         11/08/22 0030    11/08/22 0000  ibuprofen (ADVIL,MOTRIN) tablet 600 mg  Every 6 Hours Scheduled         11/07/22 2037 11/08/22 0000  bisacodyl (DULCOLAX) suppository 10 mg  Daily PRN         11/07/22 2037 11/07/22 2130  docusate sodium (COLACE) capsule 100 mg  2 Times Daily         11/07/22 2037 11/07/22 2130  acetaminophen (TYLENOL) tablet 500 mg  Every 6  Hours         11/07/22 2037 11/07/22 2038  Transfer to postpartum when discharge criteria met.  Until Discontinued         11/07/22 2037 11/07/22 2038  Notify Provider (Specified)  Until Discontinued         11/07/22 2037 11/07/22 2038  Vital Signs Per Hospital Policy  Per Hospital Policy         11/07/22 2037 11/07/22 2038  Fundal & Lochia Check  Per Order Details        Comments: Every 15 Minutes x4, Then Every 30 Minutes x2, Then Every Shift    11/07/22 2037 11/07/22 2038  Fundal & Lochia Check  Every Shift       11/07/22 2037 11/07/22 2038  Diet Regular  Diet Effective Now         11/07/22 2037 11/07/22 2038  Transfer Patient  Once         11/07/22 2037 11/07/22 2038  Code Status and Medical Interventions:  Continuous         11/07/22 2037 11/07/22 2038  Vital Signs Per Hospital Policy  Per Hospital Policy         11/07/22 2037 11/07/22 2038  Notify Physician  Until Discontinued         11/07/22 2037 11/07/22 2038  Up Ad Mickie  Until Discontinued         11/07/22 2037 11/07/22 2038  Ambulate Patient  Every Shift       11/07/22 2037 11/07/22 2038  Advance Diet as Tolerated  Until Discontinued         11/07/22 2037 11/07/22 2038  Fundal and Lochia Check  Per Order Details        Comments: Every 30 minutes x2, then Every Shift    11/07/22 2037 11/07/22 2038  RN to Assess Rh Status & Place RhIG Evaluation Order if Indicated  Continuous         11/07/22 2037 11/07/22 2038  Bladder Assessment  Per Order Details        Comments: Postpartum 1) Upon Admission to Unit & Every 4 Hours PRN Until Voiding. 2) Out of Bed to Void in 8 Hours.    11/07/22 2037 11/07/22 2038  Straight Cath  Per Order Details        Comments: Postpartum: If Distended & Unable to Void, May Repeat Once.    11/07/22 2037 11/07/22 2038  Indwelling Urinary Catheter  Per Order Details        Comments: Postpartum : After Straight Cathed x2 or if Greater Than 1000mL Residual, Insert Indwelling Urinary  Catheter Until Further MD Order.    11/07/22 2037 11/07/22 2038  Remove Vaginal Packing  Once         11/07/22 2037 11/07/22 2038  Apply Ice to Perineum  Per Order Details        Comments: For 20 Minutes Every 2 Hours    11/07/22 2037 11/07/22 2038  Waffle Cushion  Per Order Details        Comments: For Perineal Discomfort    11/07/22 2037 11/07/22 2038  Donut Ring  Per Order Details        Comments: For Perineal Pain    11/07/22 2037 11/07/22 2038  Kpad  Per Order Details        Comments: For Pain    11/07/22 2037 11/07/22 2038  Breast pump to bed  Once         11/07/22 2037 11/07/22 2038  If indicated -- Please administer RH Immunoglobulin based on results of cord blood evaluation and fetal screen lab tests, pharmacy to dispense  Per Order Details        Comments: See Process Instructions For Reference Range Details.    11/07/22 2037 11/07/22 2038  Place Sequential Compression Device  Once         11/07/22 2037 11/07/22 2038  Maintain Sequential Compression Device  Continuous         11/07/22 2037 11/07/22 2037  sodium chloride 0.9 % flush 1-10 mL  As Needed         11/07/22 2037 11/07/22 2037  oxyCODONE (ROXICODONE) immediate release tablet 5 mg  Every 4 Hours PRN         11/07/22 2037 11/07/22 2037  diphenhydrAMINE (BENADRYL) capsule 25 mg  Nightly PRN         11/07/22 2037 11/07/22 2037  magnesium hydroxide (MILK OF MAGNESIA) suspension 10 mL  Daily PRN         11/07/22 2037 11/07/22 2037  lanolin topical 1 application  Every 1 Hour PRN         11/07/22 2037 11/07/22 2037  all purpose nipple ointment 1 application  4 Times Daily PRN         11/07/22 2037 11/07/22 2037  benzocaine-menthol (DERMOPLAST) 20-0.5 % topical spray  As Needed         11/07/22 2037 11/07/22 2037  Hydrocort-Pramoxine (Perianal) (PROCTOFOAM-HS) 1-1 % rectal foam 1 application  As Needed         11/07/22 2037 11/07/22 2037  Hydrocortisone (Perianal) (ANUSOL-HC) 2.5 % rectal cream 1  application  As Needed         11/07/22 2037 11/07/22 2037  benzocaine (AMERICAINE) 20 % rectal ointment 1 application  As Needed         11/07/22 2037 11/07/22 2037  ondansetron (ZOFRAN) tablet 4 mg  Every 8 Hours PRN         11/07/22 2037 11/07/22 2037  calcium carbonate (TUMS) chewable tablet 500 mg (200 mg elemental)  3 Times Daily PRN         11/07/22 2037 11/07/22 1745  oxytocin (PITOCIN) 30 units in 0.9% sodium chloride 500 mL (premix)  Continuous        See Hyperspace for full Linked Orders Report.    11/07/22 1640    11/07/22 1741  Specimen To Pathology  Once         11/07/22 1740 11/07/22 1640  methylergonovine (METHERGINE) injection 200 mcg  Once As Needed,   Status:  Discontinued         11/07/22 1640    11/07/22 1640  carboprost (HEMABATE) injection 250 mcg  Every 15 Minutes PRN,   Status:  Discontinued         11/07/22 1640    11/07/22 1640  miSOPROStol (CYTOTEC) tablet 800 mcg  Once As Needed,   Status:  Discontinued         11/07/22 1640    11/07/22 1640  oxytocin (PITOCIN) 30 units in 0.9% sodium chloride 500 mL (premix)  Once        See Hyperspace for full Linked Orders Report.    11/07/22 1640    11/07/22 1605  Discontinue Patient Isolation  Once         11/07/22 1605    11/07/22 1217  penicillin G in iso-osmotic dextrose IVPB 3 million units (premix)  Every 4 Hours,   Status:  Discontinued        See Hyperspace for full Linked Orders Report.    11/07/22 0817    11/07/22 1015  Diet Clear Liquid  Diet Effective Now,   Status:  Canceled         11/07/22 1014    11/07/22 0800  fentaNYL 2mcg/mL and ropivacaine 0.2% in NS epidural 100mL  Continuous,   Status:  Discontinued         11/07/22 0706    11/07/22 0800  lactated ringers infusion  Continuous,   Status:  Discontinued         11/07/22 0710    11/07/22 0800  oxytocin (PITOCIN) 30 units in 0.9% sodium chloride 500 mL (premix)  Titrated,   Status:  Discontinued         11/07/22 0710    11/07/22 0709  terbutaline (BRETHINE) injection  0.25 mg  As Needed,   Status:  Discontinued         11/07/22 0710    11/07/22 0709  ondansetron (ZOFRAN) tablet 4 mg  Every 6 Hours PRN,   Status:  Discontinued        See Hyperspace for full Linked Orders Report.    11/07/22 0710    11/07/22 0709  ondansetron (ZOFRAN) injection 4 mg  Every 6 Hours PRN,   Status:  Discontinued        See Hyperspace for full Linked Orders Report.    11/07/22 0710 11/07/22 0707  Urinary Catheter Care  Every Shift,   Status:  Canceled      See Hyperspace for full Linked Orders Report.    11/07/22 0706 11/07/22 0706  ePHEDrine injection 5 mg  As Needed,   Status:  Discontinued         11/07/22 0706 11/07/22 0706  ondansetron (ZOFRAN) injection 4 mg  Once As Needed,   Status:  Discontinued         11/07/22 0706 11/07/22 0706  famotidine (PEPCID) injection 20 mg  Once As Needed,   Status:  Discontinued         11/07/22 0706 11/07/22 0706  diphenhydrAMINE (BENADRYL) injection 12.5 mg  Every 8 Hours PRN,   Status:  Discontinued         11/07/22 0706 11/07/22 0705  Position Change - For Intra-Uterine Resusitation for Hypertonus, HyperStimulation or Non-Reassuring Fetal Status  As Needed,   Status:  Canceled       11/07/22 0710 11/07/22 0705  lactated ringers bolus 1,000 mL  Once As Needed         11/07/22 0710 11/07/22 0000  Group B Streptococcus Culture - Swab, Vaginal/Rectum        Comments: This is an external result entered through the Results Console.      11/07/22 0754 11/07/22 0000  Antibody Screen        Comments: This is an external result entered through the Results Console.      11/07/22 0754 11/07/22 0000  ABO / Rh        Comments: This is an external result entered through the Results Console.      11/07/22 0754 11/07/22 0000  Hepatitis C Antibody        Comments: This is an external result entered through the Results Console.      11/07/22 0754 11/07/22 0000  Hepatitis B Surface Antigen        Comments: This is an external result entered  through the Results Console.      22 0000  RPR        Comments: This is an external result entered through the Results Console.      22 0000  Rubella Antibody, IgG        Comments: This is an external result entered through the Results Console.      22 0000  HIV-1 Antibody, EIA        Comments: This is an external result entered through the Results Console.      22    Unscheduled  Apply witch hazel pads / TUCKS to perineum as needed for comfort PRN  As Needed       22    Unscheduled  Warm compress  As Needed       22    Unscheduled  Apply ace wrap, tight bra, or binder  As Needed       22    Unscheduled  Apply ice packs  As Needed       22                 Operative/Procedure Notes (last 48 hours)      Ashanti Ritter MD at 22          [facility]  Vaginal Delivery Note    Patient Name: Sabrina Evans  :  1985  MRN:  [id number]          AMA (advanced maternal age) multigravida 35+    Pregnant    40 weeks gestation of pregnancy    Mother positive for group B Streptococcus colonization      Labor status: Induction of labor       Delivery     Delivery: Vaginal, Spontaneous     YOB: 2020    Time of Birth: 5:31 PM      Anesthesia: Epidural     Delivering clinician: Ashanti Ritter MD       Infant    Findings: Viable female  infant    Infant observations: Weight: No birth weight on file.   Observations/Comments:  scale 4      Apgars: 9  @ 1 minute /    9  @ 5 minutes     Placenta, Cord, and Fluid    Placenta delivered  Spontaneous   at  2022  5:36 PM     Cord: 3 vessels  present.   Cord blood obtained: Yes    Cord gases obtained:  No      Repair    Episiotomy: No   Lacerations: Small bilateral labial abrasions > reapproximated  Abrasion of the anterior cervix ( 11 o'clock) > silver nitrite applied          Estimated Blood Loss:  Quantitative Blood Loss:     350 mls.  Quantitative Blood Loss (mL): 374 mL         Delivery narrative: The patient is a 37 y.o.  at 40w2d who was admitted on 2022 for induction of labor due to advanced maternal age. Pitocin was started per protocol. Amniotomy was performed with clear fluid noted. The patient entered an active phase of labor and progressed along a normal labor curve to C/C/+1. The fetal heart tracing remained reassuring throughout the labor course.      With good maternal expulsive efforts,  of a viable female infant occurred. The head delivered atraumatically. The anterior and posterior shoulders delivered without difficulty. No nuchal cord was identified. The body was delivered atraumatically. The infant's nose and oropharynx were suctioned and cleared of secretions. Cord clamping was delayed a minimum of 30 seconds and then was clamped and cut. The infant was placed on the mom's chest for bonding and care. Placenta delivered spontaneously, intact, with 3 vessel cord.    The vagina and perineum were inspected and two small abrasions of the bilateral labial were noted and each separately reapproximated with an interrupted stitch of 3-0 Vicryl. There was also noted to be a small abrasion on the anterior lip if the cervix to which silver nitrite was applied for hemostasis. Rectum found to be intact. Mother and baby recovering in good condition.       Ashanti Ritter MD  [facility]   22  18:17 EST        Electronically signed by Ashanti Ritter MD at 22 1820          Physician Progress Notes (last 48 hours)      Meme Mckeon APRN at 22 0830     Attestation signed by Becca Russell MD at 22 1608    I have reviewed this documentation and agree.                  [facility]  Vaginal Delivery Progress Note    Patient Name: Sabrina Evans  :  1985  MRN:  [id number]      Subjective   Postpartum Day 1: Vaginal Delivery of a female infant.     The patient feels  "well without complaints.  Her pain is well controlled.  Reports normal lochia.     The patient plans to breastfeed.    Objective     Vital Signs Range for the last 24 hours  Temperature: Temp:  [98 °F (36.7 °C)-99.1 °F (37.3 °C)] 98.5 °F (36.9 °C)       BP: BP: ()/(37-88) 93/57   Pulse: Heart Rate:  [] 87   Respirations: Resp:  [16-18] 16                       Physical Exam:  General: Awake and alert  Abdomen: Fundus: firm, non tender, 1 below umbilicus  Extremities:  trace edema, NT     Labs:     Results from last 7 days   Lab Units 22  0539 22  0801   WBC 10*3/mm3 10.50 8.01   HEMOGLOBIN g/dL 10.5* 12.6   HEMATOCRIT % 29.5* 36.9   PLATELETS 10*3/mm3 168 176       Prenatal labs results reviewed:  Yes   Rubella:  Immune  Rh Status:    RH type   Date Value Ref Range Status   2022 Positive  Final         Assessment & Plan  : 1. PPD1 S/P  - Doing well, continue usual cares.           AMA (advanced maternal age) multigravida 35+    Pregnant    40 weeks gestation of pregnancy    Mother positive for group B Streptococcus colonization          Meme Dobson Mike, APRN  2022  09:35 EST    Electronically signed by Becca Russell MD at 22 1608     Ashanti Ritter MD at 22 1623          [facility]  Labor Progress/Update Note    Patient Name: Sabrina Evans  :  1985  MRN:  [id number]      Subjective   To rm for SVE. Patient feeling significant pressure during ctx      Objective     Vital Signs  /88   Pulse 107   Temp 98.1 °F (36.7 °C) (Oral)   Resp 18   Ht 165.1 cm (65\")   Wt 86.6 kg (191 lb)   SpO2 99%   Breastfeeding Yes   BMI 31.78 kg/m²       Physical Exam:  Gen: well appearing, NAD  Chest: normal resp effort  Cardio: Reg rate, well perfused  Abdomen: Soft, nontender, gravid  Extremities: No peripheral edema  SVE: 9.5/c/0    FHT:135/mod/+accels/+early decels  [place]: ctx q2-3min  Pit: 12u      Intake/Output Summary (Last 24 hours) at 2022 " "1623  Last data filed at 2022 1200  Gross per 24 hour   Intake --   Output 450 ml   Net -450 ml           Assessment & Plan      Anticipate       Ashanti Ritter MD  [facility]  2022  16:23 EST    Electronically signed by Ashanti Ritter MD at 22 1627     Ashanti Ritter MD at 22 1354        [facility]  Labor Progress/Update Note    Patient Name: Sabrina Evans  :  1985  MRN:  [id number]      Subjective   To rm for SVE. Comfortable with epidural.       Objective     Vital Signs  /68   Pulse 93   Temp 98.5 °F (36.9 °C) (Oral)   Resp 18   Ht 165.1 cm (65\")   Wt 86.6 kg (191 lb)   SpO2 99%   Breastfeeding Yes   BMI 31.78 kg/m²       Physical Exam:  Gen: well appearing, NAD  Chest: normal resp effort  Cardio: Reg rate, well perfused  Abdomen: Soft, nontender, gravid  Extremities: No peripheral edema  SVE: 4-5/70/-1    FHT: 145/mod/+accels/-decels  [place]: ctx q4min  Pit: 10u    No intake or output data in the 24 hours ending 22 1354        Assessment & Plan      AROM performed, clear fluid noted. Cat 1 tracing. Titrate Pit PRN.      Ashanti Ritter MD  [facility]  2022  13:54 EST    Electronically signed by Ashanti Ritter MD at 22 1358       Consult Notes (last 48 hours)  Notes from 22 0945 through 22 0945   No notes of this type exist for this encounter.       "

## 2022-11-11 NOTE — PAYOR COMM NOTE
"Sabrina Evans (37 y.o. Female)     [facility]    [address] [place] [address]  Facility NPI: [id number]    Jabari Liao  Fax: [phone]  Phone: [phone] (Mony: [phone])    Subject: MATERNITY ADMISSION AUTH REQUEST CLINICALS  Reference #: [id number]  Please don't hesitate to contact me with any questions or concerns.        Date of Birth   1985    Social Security Number       Address   [facility] [address] [place] [address]    Home Phone   [phone]    MRN   [id number]       Restoration   Rastafarian    Marital Status                               Admission Date   11/7/22    Admission Type   Elective    Admitting Provider   Yvette Thorpe MD    Attending Provider       Department, Room/Bed   [facility], E357/1       Discharge Date   11/9/2022    Discharge Disposition   Home or Self Care    Discharge Destination                               Attending Provider: (none)   Allergies: No Known Allergies    Isolation: None   Infection: None   Code Status: Prior    Ht: 165.1 cm (65\")   Wt: 86.6 kg (191 lb)    Admission Cmt: None   Principal Problem: AMA (advanced maternal age) multigravida 35+ [O09.529]                 Active Insurance as of 11/7/2022     Primary Coverage     Payor Plan Insurance Group Employer/Plan Group    [facility] [id number]     Payor Plan Address Payor Plan Phone Number Payor Plan Fax Number Effective Dates    [address] [address]   5/31/2022 - None Entered    [facility] [id number]       Subscriber Name Subscriber Birth Date Member ID       JOSEELIAS LAND 8/12/1983 [id number]                 Emergency Contacts      (Rel.) Home Phone Work Phone Mobile Phone    JoseElias land (Spouse) [phone] -- [phone]            Insurance Information                [employer] [facility] Phone: --    Subscriber: Elias Evans Subscriber#: [id number]    Group#: [id number] Precert#: --             History & " Physical      Ashanti Ritter MD at 22 1139          [facility]- Labor & Delivery History and Physical      Patient Name: Sabrina Evans  YOB: 1985  MRN: [id number]      Chief Complaint   Patient presents with   • Scheduled Induction       Subjective      Patient is a 37 y.o. female  currently at 40w2d, who presents for induction of labor due to advanced maternal age. Intermittently perceiving strong contractions, currently on 8u of Pitocin. Denies CLARKE TELLO. Endorses FM    Her prenatal care is complicated by:  - Advanced Maternal Age  - Group B Strep Positive    The following portions of the patients history were reviewed and updated as appropriate: current medications, allergies, past medical history and problem list .       Prenatal Information:  External Prenatal Results     Pregnancy Outside Results - Transcribed From Office Records - See Scanned Records For Details     Test Value Date Time    ABO  A  22 0801    Rh  Positive  22 0801    Antibody Screen  Negative  22 0801      ^ Negative  22     Varicella IgG       Rubella ^ Immune  22     Hgb  12.6 g/dL 22 0801       11.0 g/dL 22 1426    Hct  36.9 % 22 0801       31.9 % 22 1426    Glucose Fasting GTT       Glucose Tolerance Test 1 hour       Glucose Tolerance Test 3 hour       Gonorrhea (discrete)       Chlamydia (discrete)       RPR ^ Non-Reactive  22     VDRL       Syphilis Antibody       HBsAg ^ Negative  22     Herpes Simplex Virus PCR       Herpes Simplex VIrus Culture       HIV ^ Negative  22     Hep C RNA Quant PCR       Hep C Antibody ^ Neg  22     AFP       Group B Strep ^ POS  10/12/22     GBS Susceptibility to Clindamycin       GBS Susceptibility to Erythromycin       Fetal Fibronectin       Genetic Testing, Maternal Blood             Drug Screening     Test Value Date Time    Urine Drug Screen       Amphetamine Screen        Barbiturate Screen       Benzodiazepine Screen       Methadone Screen       Phencyclidine Screen       Opiates Screen       THC Screen       Cocaine Screen       Propoxyphene Screen       Buprenorphine Screen       Methamphetamine Screen       Oxycodone Screen       Tricyclic Antidepressants Screen             Legend    ^: Historical                         Past OB History:     OB History    Para Term  AB Living   4 1 1 0 2 1   SAB IAB Ectopic Molar Multiple Live Births   2 0 0 0 0 1      # Outcome Date GA Lbr Stiven/2nd Weight Sex Delivery Anes PTL Lv   4 Current            3 SAB 2021 7w3d    SAB      2 Term 17 41w1d 15:50 / 00:53 3768 g (8 lb 4.9 oz) F Vag-Spont EPI N AMBROCIO      Birth Comments: scale #1      Name: ROSE MARIE HASSAN      Apgar1: 9  Apgar5: 9   1 SAB                Past Medical History: Past Medical History:   Diagnosis Date   • Asthma    • Kidney stones       Past Surgical History History reviewed. No pertinent surgical history.   Family History: Family History   Problem Relation Age of Onset   • Hypertension Mother    • No Known Problems Father    • No Known Problems Sister    • No Known Problems Brother    • No Known Problems Son    • No Known Problems Daughter    • No Known Problems Maternal Grandmother    • No Known Problems Maternal Grandfather    • No Known Problems Paternal Grandmother    • No Known Problems Paternal Grandfather    • No Known Problems Cousin       Social History:  reports that she has never smoked. She has never used smokeless tobacco.   reports no history of alcohol use.   reports no history of drug use.        General ROS:   Review of Systems   Constitutional: Negative for chills and fever.   Respiratory: Negative for cough and shortness of breath.    Cardiovascular: Negative for chest pain and palpitations.   Gastrointestinal: Negative for abdominal pain, constipation, diarrhea, nausea and vomiting.   Genitourinary: Negative for dysuria.         Objective        Vital Signs Range for the last 24 hours  Temperature: Temp:  [98.3 °F (36.8 °C)] 98.3 °F (36.8 °C)   Temp Source: Temp src: Oral   BP: BP: ()/(60-77) 117/70   Pulse: Heart Rate:  [] 93   Respirations: Resp:  [18] 18   SPO2:     O2 Amount (l/min):     O2 Devices     Weight: Weight:  [85.7 kg (189 lb)-86.6 kg (191 lb)] 86.6 kg (191 lb)       Labs on Admission:  Results from last 7 days   Lab Units 11/07/22  0801   WBC 10*3/mm3 8.01   HEMOGLOBIN g/dL 12.6   HEMATOCRIT % 36.9   PLATELETS 10*3/mm3 176           Physical Examination:   Physical Exam  Vitals reviewed.   Constitutional:       General: She is not in acute distress.     Appearance: Normal appearance.   HENT:      Head: Normocephalic and atraumatic.   Eyes:      Extraocular Movements: Extraocular movements intact.      Pupils: Pupils are equal, round, and reactive to light.   Cardiovascular:      Rate and Rhythm: Normal rate and regular rhythm.      Pulses: Normal pulses.      Heart sounds: Normal heart sounds.   Pulmonary:      Effort: Pulmonary effort is normal.      Breath sounds: Normal breath sounds.   Abdominal:      Tenderness: There is no abdominal tenderness. There is no guarding.      Comments: Gravid; Fundal height appropriate for GA   Skin:     General: Skin is warm and dry.   Neurological:      General: No focal deficit present.      Mental Status: She is alert and oriented to person, place, and time.   Psychiatric:         Mood and Affect: Mood normal.          Presentation: Vertex   Cervix: Exam by: Method: sterile exam per physician   Dilation: Cervical Dilation (cm): 3-4   Effacement: Cervical Effacement: 70%   Station: Fetal Station: 1-->-2       Fetal Heart Rate Assessment   Method: Fetal HR Assessment Method: external   Beats/min: Fetal HR (beats/min): 140   Baseline: Fetal HR Baseline: normal range   Variability: Fetal HR Variability: moderate (amplitude range 6 to 25 bpm)   Accels: Fetal HR Accelerations:  greater than/equal to 15 bpm, lasting at least 15 seconds   Decels: Fetal HR Decelerations: absent   Tracing Category:       Uterine Assessment   Method: Method: palpation, per patient report, external tocotransducer   Frequency (min): Contraction Frequency (Minutes): 4-6   Ctx Count in 10 min:     Duration:     Intensity: Contraction Intensity: mild by palpation   Intensity by IUPC:     Resting Tone: Uterine Resting Tone: soft by palpation   Resting Tone by IUPC:     [place] Units:         Assessment & Plan       Pregnant    40 weeks gestation of pregnancy    AMA (advanced maternal age) multigravida 35+    Mother positive for group B Streptococcus colonization        1.  Intrauterine pregnancy at 40w2d weeks gestation undergoing IOL for AMA.  Reassuring fetal status.   Continue pitocin, plan for AROM after epidural. Anticipate .  Plan of care has been reviewed with patient and family.  All questions answered.    2. GBS Positive > Intrapartum PCN ordered      Ashanti Ritter MD  [facility]  2022  11:49 EST      Electronically signed by Ashanti Ritter MD at 22 1152          Discharge Summaryl      Angeline Silva, RN at 22 1200        This note was copied from a baby's chart.  PT is going home today. Mom reports baby is BF well.  Informed her about the OPLC info and and mommy and Me info on the back of the educational booklet. Educated on baby's expected output and weight gain. Discussed engorgement, mastitis, pumping, milk storage, colostrum expectations and when to expect mature milk supply. PT declines any questions and concerns at this time. Encouraged to call LC if needing further assistance.    Electronically signed by Angeline Silva, RN at 22 1258     Cheko Lozada, RN at 22 1056     Summary:Discharge             Patient education complete. Patient ready for discharge to home with .    Electronically signed by Cheko Lozada  "RN at 11/09/22 1057     Cheko Lozada, RN at 11/09/22 1055        Goal Outcome Evaluation:                   Electronically signed by Cheko Lozada, RN at 11/09/22 1055     Maya Loving MA at 11/09/22 0944          Sabrina Evans (37 y.o. Female)     [facility] Patricia Moon  [address] [place] [address] [place] [address]  Maya Loving - [phone]  Utilization Review/Room Reservations  Phone: [phone], [phone], [phone] or [phone]  Fax: [phone]  Email: [email or web address]  Please call, fax back, or email with authorization or any questions! Thanks!  CLINICALS FOR INPT MATERNITY   REF#[id number]    Date of Birth   1985    Social Security Number       Address   [address] [place] [address] [place] [facility]    Home Phone   [phone]    N   [id number]       Islam   Moravian    Marital Status                               Admission Date   11/7/22    Admission Type   Elective    Admitting Provider   Yvette Thorpe MD    Attending Provider   Yvette Thorpe MD    Department, Room/Bed   [address], E357/1       Discharge Date       Discharge Disposition   Home or Self Care    Discharge Destination                               Attending Provider: Yvette Thorpe MD    Allergies: No Known Allergies    Isolation: None   Infection: None   Code Status: CPR    Ht: 165.1 cm (65\")   Wt: 86.6 kg (191 lb)    Admission Cmt: None   Principal Problem: AMA (advanced maternal age) multigravida 35+ [O09.529]                 Active Insurance as of 11/7/2022     Primary Coverage     Payor Plan Insurance Group Employer/Plan Group    [facility] [id number]     Payor Plan Address Payor Plan Phone Number Payor Plan Fax Number Effective Dates    [address] [address]   5/31/2022 - None Entered    [facility] [id number]       Subscriber Name Subscriber Birth " Date Member ID       ELIAS HASSAN 1981 [id number]                 Emergency Contacts      (Rel.) Home Phone Work Phone Mobile Phone    Elias Hassan (Spouse) [phone] -- [phone]            Insurance Information                [employer] Phone: --    Subscriber: Elias Hassan Subscriber#: [id number]    Group#: [id number] Precert#: --          Problem List           Codes Noted - Resolved       Hospital    Pregnant ICD-10-CM: Z34.90  ICD-9-CM: V22.2 2022 - Present    40 weeks gestation of pregnancy ICD-10-CM: Z3A.40  ICD-9-CM: V22.2 2022 - Present    * (Principal) AMA (advanced maternal age) multigravida 35+ ICD-10-CM: O09.529  ICD-9-CM: 659.60 2022 - Present    Mother positive for group B Streptococcus colonization ICD-10-CM: P00.82  ICD-9-CM: V29.0 2022 - Present       Non-Hospital    Pregnancy ICD-10-CM: Z34.90  ICD-9-CM: V22.2 2022 - Present    Flank pain ICD-10-CM: R10.9  ICD-9-CM: 789.09 2022 - Present    Kidney stones ICD-10-CM: N20.0  ICD-9-CM: 592.0 2022 - Present    Postpartum anemia ICD-10-CM: O90.81  ICD-9-CM: 648.24, 285.9 2017 - Present        History & Physical      Ashanti Ritter MD at 22 [facility]9          [facility]- Labor & Delivery History and Physical      Patient Name: Sabrina Hassan  YOB: 1985  MRN: [id number]      Chief Complaint   Patient presents with   • Scheduled Induction       Subjective      Patient is a 37 y.o. female  currently at 40w2d, who presents for induction of labor due to advanced maternal age. Intermittently perceiving strong contractions, currently on 8u of Pitocin. Denies LOF, VB. Endorses FM    Her prenatal care is complicated by:  - Advanced Maternal Age  - Group B Strep Positive    The following portions of the patients history were reviewed and updated as appropriate: current medications, allergies, past medical history and problem list .       Prenatal  Information:  External Prenatal Results     Pregnancy Outside Results - Transcribed From Office Records - See Scanned Records For Details     Test Value Date Time    ABO  A  22 0801    Rh  Positive  22 0801    Antibody Screen  Negative  22 0801      ^ Negative  22     Varicella IgG       Rubella ^ Immune  22     Hgb  12.6 g/dL 22 0801       11.0 g/dL 22 1426    Hct  36.9 % 22 0801       31.9 % 22 1426    Glucose Fasting GTT       Glucose Tolerance Test 1 hour       Glucose Tolerance Test 3 hour       Gonorrhea (discrete)       Chlamydia (discrete)       RPR ^ Non-Reactive  22     VDRL       Syphilis Antibody       HBsAg ^ Negative  22     Herpes Simplex Virus PCR       Herpes Simplex VIrus Culture       HIV ^ Negative  22     Hep C RNA Quant PCR       Hep C Antibody ^ Neg  22     AFP       Group B Strep ^ POS  10/12/22     GBS Susceptibility to Clindamycin       GBS Susceptibility to Erythromycin       Fetal Fibronectin       Genetic Testing, Maternal Blood             Drug Screening     Test Value Date Time    Urine Drug Screen       Amphetamine Screen       Barbiturate Screen       Benzodiazepine Screen       Methadone Screen       Phencyclidine Screen       Opiates Screen       THC Screen       Cocaine Screen       Propoxyphene Screen       Buprenorphine Screen       Methamphetamine Screen       Oxycodone Screen       Tricyclic Antidepressants Screen             Legend    ^: Historical                         Past OB History:     OB History    Para Term  AB Living   4 1 1 0 2 1   SAB IAB Ectopic Molar Multiple Live Births   2 0 0 0 0 1      # Outcome Date GA Lbr Stiven/2nd Weight Sex Delivery Anes PTL Lv   4 Current            3 SAB 2021 7w3d    SAB      2 Term 17 41w1d 15:50 / 00:53 3768 g (8 lb 4.9 oz) F Vag-Spont EPI N AMBROCIO      Birth Comments: scale #1      Name: ROSE MARIE HASSAN      Apgar1: 9  Apgar5: 9   1  SAB                Past Medical History: Past Medical History:   Diagnosis Date   • Asthma    • Kidney stones       Past Surgical History History reviewed. No pertinent surgical history.   Family History: Family History   Problem Relation Age of Onset   • Hypertension Mother    • No Known Problems Father    • No Known Problems Sister    • No Known Problems Brother    • No Known Problems Son    • No Known Problems Daughter    • No Known Problems Maternal Grandmother    • No Known Problems Maternal Grandfather    • No Known Problems Paternal Grandmother    • No Known Problems Paternal Grandfather    • No Known Problems Cousin       Social History:  reports that she has never smoked. She has never used smokeless tobacco.   reports no history of alcohol use.   reports no history of drug use.        General ROS:   Review of Systems   Constitutional: Negative for chills and fever.   Respiratory: Negative for cough and shortness of breath.    Cardiovascular: Negative for chest pain and palpitations.   Gastrointestinal: Negative for abdominal pain, constipation, diarrhea, nausea and vomiting.   Genitourinary: Negative for dysuria.        Objective        Vital Signs Range for the last 24 hours  Temperature: Temp:  [98.3 °F (36.8 °C)] 98.3 °F (36.8 °C)   Temp Source: Temp src: Oral   BP: BP: ()/(60-77) 117/70   Pulse: Heart Rate:  [] 93   Respirations: Resp:  [18] 18   SPO2:     O2 Amount (l/min):     O2 Devices     Weight: Weight:  [85.7 kg (189 lb)-86.6 kg (191 lb)] 86.6 kg (191 lb)       Labs on Admission:  Results from last 7 days   Lab Units 11/07/22  0801   WBC 10*3/mm3 8.01   HEMOGLOBIN g/dL 12.6   HEMATOCRIT % 36.9   PLATELETS 10*3/mm3 176           Physical Examination:   Physical Exam  Vitals reviewed.   Constitutional:       General: She is not in acute distress.     Appearance: Normal appearance.   HENT:      Head: Normocephalic and atraumatic.   Eyes:      Extraocular Movements: Extraocular movements  intact.      Pupils: Pupils are equal, round, and reactive to light.   Cardiovascular:      Rate and Rhythm: Normal rate and regular rhythm.      Pulses: Normal pulses.      Heart sounds: Normal heart sounds.   Pulmonary:      Effort: Pulmonary effort is normal.      Breath sounds: Normal breath sounds.   Abdominal:      Tenderness: There is no abdominal tenderness. There is no guarding.      Comments: Gravid; Fundal height appropriate for GA   Skin:     General: Skin is warm and dry.   Neurological:      General: No focal deficit present.      Mental Status: She is alert and oriented to person, place, and time.   Psychiatric:         Mood and Affect: Mood normal.          Presentation: Vertex   Cervix: Exam by: Method: sterile exam per physician   Dilation: Cervical Dilation (cm): 3-4   Effacement: Cervical Effacement: 70%   Station: Fetal Station: 1-->-2       Fetal Heart Rate Assessment   Method: Fetal HR Assessment Method: external   Beats/min: Fetal HR (beats/min): 140   Baseline: Fetal HR Baseline: normal range   Variability: Fetal HR Variability: moderate (amplitude range 6 to 25 bpm)   Accels: Fetal HR Accelerations: greater than/equal to 15 bpm, lasting at least 15 seconds   Decels: Fetal HR Decelerations: absent   Tracing Category:       Uterine Assessment   Method: Method: palpation, per patient report, external tocotransducer   Frequency (min): Contraction Frequency (Minutes): 4-6   Ctx Count in 10 min:     Duration:     Intensity: Contraction Intensity: mild by palpation   Intensity by IUPC:     Resting Tone: Uterine Resting Tone: soft by palpation   Resting Tone by IUPC:     [place] Units:         Assessment & Plan       Pregnant    40 weeks gestation of pregnancy    AMA (advanced maternal age) multigravida 35+    Mother positive for group B Streptococcus colonization        1.  Intrauterine pregnancy at 40w2d weeks gestation undergoing IOL for AMA.  Reassuring fetal status.   Continue pitocin, plan  for AROM after epidural. Anticipate .  Plan of care has been reviewed with patient and family.  All questions answered.    2. GBS Positive > Intrapartum PCN ordered      Ashanti Ritter MD  [facility]  2022  11:49 EST      Electronically signed by Ashanti Ritter MD at 22 1152         Facility-Administered Medications as of 2022   Medication Dose Route Frequency Provider Last Rate Last Admin   • acetaminophen (TYLENOL) tablet 500 mg  500 mg Oral Q6H Ashanti Ritter MD   500 mg at 22 0906   • all purpose nipple ointment 1 application  1 application Topical 4x Daily PRN Ashanti Ritter MD       • benzocaine (AMERICAINE) 20 % rectal ointment 1 application  1 application Rectal PRN Ashanti Ritter MD       • benzocaine-menthol (DERMOPLAST) 20-0.5 % topical spray   Topical PRN Ashanti Ritter MD       • benzonatate (TESSALON) capsule 100 mg  100 mg Oral TID PRN Ashanti Ritter MD   100 mg at 22 0908   • bisacodyl (DULCOLAX) suppository 10 mg  10 mg Rectal Daily PRN Ashanti Ritter MD       • calcium carbonate (TUMS) chewable tablet 500 mg (200 mg elemental)  2 tablet Oral TID PRN Ashanti Ritter MD       • diphenhydrAMINE (BENADRYL) capsule 25 mg  25 mg Oral Nightly PRN Ashanti Ritter MD       • docusate sodium (COLACE) capsule 100 mg  100 mg Oral BID Ashanti Ritter MD   100 mg at 22 0907   • guaiFENesin (MUCINEX) 12 hr tablet 600 mg  600 mg Oral Q12H Ashanti Ritter MD   600 mg at 22 0907   • Hydrocort-Pramoxine (Perianal) (PROCTOFOAM-HS) 1-1 % rectal foam 1 application  1 application Topical PRN Ashanti Ritter MD       • Hydrocortisone (Perianal) (ANUSOL-HC) 2.5 % rectal cream 1 application  1 application Rectal PRN Ashanti Ritter MD       • ibuprofen (ADVIL,MOTRIN) tablet 600 mg  600 mg Oral Q6H Ashanti Ritter MD   600 mg at 22 06   • [COMPLETED] ibuprofen (ADVIL,MOTRIN) tablet 800 mg  800 mg Oral Once Yu Singleton MD   800 mg at  22   • [COMPLETED] lactated ringers bolus 1,000 mL  1,000 mL Intravenous Once PRN Yvette Thorpe MD   Stopped at 22 1237   • lanolin topical 1 application  1 application Topical Q1H PRN Ashanti Ritter MD   1 application at 22 0857   • magnesium hydroxide (MILK OF MAGNESIA) suspension 10 mL  10 mL Oral Daily PRN Ashanti Ritter MD       • ondansetron (ZOFRAN) tablet 4 mg  4 mg Oral Q8H PRN Ashanti Ritter MD       • oxyCODONE (ROXICODONE) immediate release tablet 5 mg  5 mg Oral Q4H PRN Ashanti Ritter MD       • [COMPLETED] oxytocin (PITOCIN) 30 units in 0.9% sodium chloride 500 mL (premix)  999 mL/hr Intravenous Once Yvette Thorpe  mL/hr at 22 1737 999 mL/hr at 22 1737    Followed by   • [] oxytocin (PITOCIN) 30 units in 0.9% sodium chloride 500 mL (premix)  250 mL/hr Intravenous Continuous Yvette Thorep  mL/hr at 22 1836 250 mL/hr at 22 1836   • [COMPLETED] penicillin G potassium 5 Million Units in sodium chloride 0.9 % 100 mL IVPB-VTB  5 Million Units Intravenous Once Yvette Thorpe MD   Stopped at 22 0920   • prenatal vitamin tablet 1 tablet  1 tablet Oral Daily Ashanti Ritter MD   1 tablet at 22 0906   • sodium chloride 0.9 % flush 1-10 mL  1-10 mL Intravenous PRN Ashanti Ritter MD         Lab Results (last 48 hours)     Procedure Component Value Units Date/Time    CBC & Differential [[id number]]  (Abnormal) Collected: 22    Specimen: Blood Updated: 22    Narrative:      The following orders were created for panel order CBC & Differential.  Procedure                               Abnormality         Status                     ---------                               -----------         ------                     CBC Auto Differential[[id number]]        Abnormal            Final result                 Please view results for these tests on the individual orders.    CBC Auto Differential [[id number]]   (Abnormal) Collected: 11/08/22 0539    Specimen: Blood Updated: 11/08/22 0725     WBC 10.50 10*3/mm3      RBC 3.38 10*6/mm3      Hemoglobin 10.5 g/dL      Hematocrit 29.5 %      MCV 87.3 fL      MCH 31.1 pg      MCHC 35.6 g/dL      RDW 12.7 %      RDW-SD 40.8 fl      MPV 9.0 fL      Platelets 168 10*3/mm3      Neutrophil % 69.3 %      Lymphocyte % 21.7 %      Monocyte % 6.5 %      Eosinophil % 1.0 %      Basophil % 0.3 %      Immature Grans % 1.2 %      Neutrophils, Absolute 7.27 10*3/mm3      Lymphocytes, Absolute 2.28 10*3/mm3      Monocytes, Absolute 0.68 10*3/mm3      Eosinophils, Absolute 0.11 10*3/mm3      Basophils, Absolute 0.03 10*3/mm3      Immature Grans, Absolute 0.13 10*3/mm3      nRBC 0.0 /100 WBC           Imaging Results (Last 48 Hours)     ** No results found for the last 48 hours. **        ECG/EMG Results (last 48 hours)     ** No results found for the last 48 hours. **          Orders (last 48 hrs)      Start     Ordered    11/09/22 0906  Discharge patient  Once         11/09/22 0906 11/09/22 0000  ibuprofen (ADVIL,MOTRIN) 600 MG tablet  Every 6 Hours Scheduled         11/09/22 0906 11/09/22 0000  benzonatate (TESSALON) 100 MG capsule  3 Times Daily PRN         11/09/22 0906 11/08/22 0900  prenatal vitamin tablet 1 tablet  Daily         11/07/22 2037 11/08/22 0800  Sitz Bath  3 Times Daily        Comments: PRN    11/07/22 2037 11/08/22 0600  CBC & Differential  Morning Draw        Comments: Postpartum Day 1      11/07/22 2037 11/08/22 0600  CBC Auto Differential  PROCEDURE ONCE        Comments: Postpartum Day 1      11/07/22 2205 11/08/22 0130  guaiFENesin (MUCINEX) 12 hr tablet 600 mg  Every 12 Hours Scheduled         11/08/22 0030    11/08/22 0029  benzonatate (TESSALON) capsule 100 mg  3 Times Daily PRN         11/08/22 0030    11/08/22 0000  ibuprofen (ADVIL,MOTRIN) tablet 600 mg  Every 6 Hours Scheduled         11/07/22 2037 11/08/22 0000  bisacodyl (DULCOLAX)  suppository 10 mg  Daily PRN         11/07/22 2037 11/07/22 2130  docusate sodium (COLACE) capsule 100 mg  2 Times Daily         11/07/22 2037 11/07/22 2130  acetaminophen (TYLENOL) tablet 500 mg  Every 6 Hours         11/07/22 2037 11/07/22 2038  Transfer to postpartum when discharge criteria met.  Until Discontinued         11/07/22 2037 11/07/22 2038  Notify Provider (Specified)  Until Discontinued         11/07/22 2037 11/07/22 2038  Vital Signs Per Hospital Policy  Per Hospital Policy         11/07/22 2037 11/07/22 2038  Fundal & Lochia Check  Per Order Details        Comments: Every 15 Minutes x4, Then Every 30 Minutes x2, Then Every Shift    11/07/22 2037 11/07/22 2038  Fundal & Lochia Check  Every Shift       11/07/22 2037 11/07/22 2038  Diet Regular  Diet Effective Now         11/07/22 2037 11/07/22 2038  Transfer Patient  Once         11/07/22 2037 11/07/22 2038  Code Status and Medical Interventions:  Continuous         11/07/22 2037 11/07/22 2038  Vital Signs Per Hospital Policy  Per Hospital Policy         11/07/22 2037 11/07/22 2038  Notify Physician  Until Discontinued         11/07/22 2037 11/07/22 2038  Up Ad Mickie  Until Discontinued         11/07/22 2037 11/07/22 2038  Ambulate Patient  Every Shift       11/07/22 2037 11/07/22 2038  Advance Diet as Tolerated  Until Discontinued         11/07/22 2037 11/07/22 2038  Fundal and Lochia Check  Per Order Details        Comments: Every 30 minutes x2, then Every Shift    11/07/22 2037 11/07/22 2038  RN to Assess Rh Status & Place RhIG Evaluation Order if Indicated  Continuous         11/07/22 2037 11/07/22 2038  Bladder Assessment  Per Order Details        Comments: Postpartum 1) Upon Admission to Unit & Every 4 Hours PRN Until Voiding. 2) Out of Bed to Void in 8 Hours.    11/07/22 2037 11/07/22 2038  Straight Cath  Per Order Details        Comments: Postpartum: If Distended & Unable to Void, May  Repeat Once.    11/07/22 2037 11/07/22 2038  Indwelling Urinary Catheter  Per Order Details        Comments: Postpartum : After Straight Cathed x2 or if Greater Than 1000mL Residual, Insert Indwelling Urinary Catheter Until Further MD Order.    11/07/22 2037 11/07/22 2038  Remove Vaginal Packing  Once         11/07/22 2037 11/07/22 2038  Apply Ice to Perineum  Per Order Details        Comments: For 20 Minutes Every 2 Hours    11/07/22 2037 11/07/22 2038  Waffle Cushion  Per Order Details        Comments: For Perineal Discomfort    11/07/22 2037 11/07/22 2038  Donut Ring  Per Order Details        Comments: For Perineal Pain    11/07/22 2037 11/07/22 2038  Kpad  Per Order Details        Comments: For Pain    11/07/22 2037 11/07/22 2038  Breast pump to bed  Once         11/07/22 2037 11/07/22 2038  If indicated -- Please administer RH Immunoglobulin based on results of cord blood evaluation and fetal screen lab tests, pharmacy to dispense  Per Order Details        Comments: See Process Instructions For Reference Range Details.    11/07/22 2037 11/07/22 2038  Place Sequential Compression Device  Once         11/07/22 2037 11/07/22 2038  Maintain Sequential Compression Device  Continuous         11/07/22 2037 11/07/22 2037  sodium chloride 0.9 % flush 1-10 mL  As Needed         11/07/22 2037 11/07/22 2037  oxyCODONE (ROXICODONE) immediate release tablet 5 mg  Every 4 Hours PRN         11/07/22 2037 11/07/22 2037  diphenhydrAMINE (BENADRYL) capsule 25 mg  Nightly PRN         11/07/22 2037 11/07/22 2037  magnesium hydroxide (MILK OF MAGNESIA) suspension 10 mL  Daily PRN         11/07/22 2037 11/07/22 2037  lanolin topical 1 application  Every 1 Hour PRN         11/07/22 2037 11/07/22 2037  all purpose nipple ointment 1 application  4 Times Daily PRN         11/07/22 2037 11/07/22 2037  benzocaine-menthol (DERMOPLAST) 20-0.5 % topical spray  As Needed         11/07/22 2037     11/07/22 2037  Hydrocort-Pramoxine (Perianal) (PROCTOFOAM-HS) 1-1 % rectal foam 1 application  As Needed         11/07/22 2037 11/07/22 2037  Hydrocortisone (Perianal) (ANUSOL-HC) 2.5 % rectal cream 1 application  As Needed         11/07/22 2037 11/07/22 2037  benzocaine (AMERICAINE) 20 % rectal ointment 1 application  As Needed         11/07/22 2037 11/07/22 2037  ondansetron (ZOFRAN) tablet 4 mg  Every 8 Hours PRN         11/07/22 2037 11/07/22 2037  calcium carbonate (TUMS) chewable tablet 500 mg (200 mg elemental)  3 Times Daily PRN         11/07/22 2037 11/07/22 1745  oxytocin (PITOCIN) 30 units in 0.9% sodium chloride 500 mL (premix)  Continuous        See Hyperspace for full Linked Orders Report.    11/07/22 1640 11/07/22 1741  Specimen To Pathology  Once         11/07/22 1740 11/07/22 1640  methylergonovine (METHERGINE) injection 200 mcg  Once As Needed,   Status:  Discontinued         11/07/22 1640    11/07/22 1640  carboprost (HEMABATE) injection 250 mcg  Every 15 Minutes PRN,   Status:  Discontinued         11/07/22 1640    11/07/22 1640  miSOPROStol (CYTOTEC) tablet 800 mcg  Once As Needed,   Status:  Discontinued         11/07/22 1640    11/07/22 1640  oxytocin (PITOCIN) 30 units in 0.9% sodium chloride 500 mL (premix)  Once        See Hyperspace for full Linked Orders Report.    11/07/22 1640    11/07/22 1605  Discontinue Patient Isolation  Once         11/07/22 1605    11/07/22 1217  penicillin G in iso-osmotic dextrose IVPB 3 million units (premix)  Every 4 Hours,   Status:  Discontinued        See Hyperspace for full Linked Orders Report.    11/07/22 0817    11/07/22 1015  Diet Clear Liquid  Diet Effective Now,   Status:  Canceled         11/07/22 1014    11/07/22 0800  fentaNYL 2mcg/mL and ropivacaine 0.2% in NS epidural 100mL  Continuous,   Status:  Discontinued         11/07/22 0706    11/07/22 0800  lactated ringers infusion  Continuous,   Status:  Discontinued          11/07/22 0710    11/07/22 0800  oxytocin (PITOCIN) 30 units in 0.9% sodium chloride 500 mL (premix)  Titrated,   Status:  Discontinued         11/07/22 0710 11/07/22 0709  terbutaline (BRETHINE) injection 0.25 mg  As Needed,   Status:  Discontinued         11/07/22 0710    11/07/22 0709  ondansetron (ZOFRAN) tablet 4 mg  Every 6 Hours PRN,   Status:  Discontinued        See Hyperspace for full Linked Orders Report.    11/07/22 0710 11/07/22 0709  ondansetron (ZOFRAN) injection 4 mg  Every 6 Hours PRN,   Status:  Discontinued        See Hyperspace for full Linked Orders Report.    11/07/22 0710 11/07/22 0707  Urinary Catheter Care  Every Shift,   Status:  Canceled      See Hyperspace for full Linked Orders Report.    11/07/22 0706 11/07/22 0706  ePHEDrine injection 5 mg  As Needed,   Status:  Discontinued         11/07/22 0706 11/07/22 0706  ondansetron (ZOFRAN) injection 4 mg  Once As Needed,   Status:  Discontinued         11/07/22 0706 11/07/22 0706  famotidine (PEPCID) injection 20 mg  Once As Needed,   Status:  Discontinued         11/07/22 0706 11/07/22 0706  diphenhydrAMINE (BENADRYL) injection 12.5 mg  Every 8 Hours PRN,   Status:  Discontinued         11/07/22 0706 11/07/22 0705  Position Change - For Intra-Uterine Resusitation for Hypertonus, HyperStimulation or Non-Reassuring Fetal Status  As Needed,   Status:  Canceled       11/07/22 0710 11/07/22 0705  lactated ringers bolus 1,000 mL  Once As Needed         11/07/22 0710    11/07/22 0000  Group B Streptococcus Culture - Swab, Vaginal/Rectum        Comments: This is an external result entered through the Results Console.      11/07/22 0754 11/07/22 0000  Antibody Screen        Comments: This is an external result entered through the Results Console.      11/07/22 0754 11/07/22 0000  ABO / Rh        Comments: This is an external result entered through the Results Console.      11/07/22 0754 11/07/22 0000  Hepatitis C  Antibody        Comments: This is an external result entered through the Results Console.      22 0000  Hepatitis B Surface Antigen        Comments: This is an external result entered through the Results Console.      22 0000  RPR        Comments: This is an external result entered through the Results Console.      22 0000  Rubella Antibody, IgG        Comments: This is an external result entered through the Results Console.      22 0000  HIV-1 Antibody, EIA        Comments: This is an external result entered through the Results Console.      22    Unscheduled  Apply witch hazel pads / TUCKS to perineum as needed for comfort PRN  As Needed       22    Unscheduled  Warm compress  As Needed       22    Unscheduled  Apply ace wrap, tight bra, or binder  As Needed       22    Unscheduled  Apply ice packs  As Needed       22                 Operative/Procedure Notes (last 48 hours)      Ashanti Ritter MD at 22          [facility]  Vaginal Delivery Note    Patient Name: Sabrina Evans  :  1985  MRN:  [id number]          AMA (advanced maternal age) multigravida 35+    Pregnant    40 weeks gestation of pregnancy    Mother positive for group B Streptococcus colonization      Labor status: Induction of labor       Delivery     Delivery: Vaginal, Spontaneous     YOB: 2020    Time of Birth: 5:31 PM      Anesthesia: Epidural     Delivering clinician: Ashanti Ritter MD       Infant    Findings: Viable female  infant    Infant observations: Weight: No birth weight on file.   Observations/Comments:  scale 4      Apgars: 9  @ 1 minute /    9  @ 5 minutes     Placenta, Cord, and Fluid    Placenta delivered  Spontaneous   at  2022  5:36 PM     Cord: 3 vessels  present.   Cord blood obtained: Yes    Cord gases obtained:  No      Repair     Episiotomy: No   Lacerations: Small bilateral labial abrasions > reapproximated  Abrasion of the anterior cervix ( 11 o'clock) > silver nitrite applied          Estimated Blood Loss:  Quantitative Blood Loss:    350 mls.  Quantitative Blood Loss (mL): 374 mL         Delivery narrative: The patient is a 37 y.o.  at 40w2d who was admitted on 2022 for induction of labor due to advanced maternal age. Pitocin was started per protocol. Amniotomy was performed with clear fluid noted. The patient entered an active phase of labor and progressed along a normal labor curve to C/C/+1. The fetal heart tracing remained reassuring throughout the labor course.      With good maternal expulsive efforts,  of a viable female infant occurred. The head delivered atraumatically. The anterior and posterior shoulders delivered without difficulty. No nuchal cord was identified. The body was delivered atraumatically. The infant's nose and oropharynx were suctioned and cleared of secretions. Cord clamping was delayed a minimum of 30 seconds and then was clamped and cut. The infant was placed on the mom's chest for bonding and care. Placenta delivered spontaneously, intact, with 3 vessel cord.    The vagina and perineum were inspected and two small abrasions of the bilateral labial were noted and each separately reapproximated with an interrupted stitch of 3-0 Vicryl. There was also noted to be a small abrasion on the anterior lip if the cervix to which silver nitrite was applied for hemostasis. Rectum found to be intact. Mother and baby recovering in good condition.       Ashanti Ritter MD  [facility]   22  18:17 EST        Electronically signed by Ashanti Ritter MD at 22 1820          Physician Progress Notes (last 48 hours)      Meme Mckeon APRN at 22 0830     Attestation signed by Becca Russell MD at 22 1608    I have reviewed this documentation and agree.               "    [facility]  Vaginal Delivery Progress Note    Patient Name: Sabrina Evans  :  1985  MRN:  [id number]      Subjective   Postpartum Day 1: Vaginal Delivery of a female infant.     The patient feels well without complaints.  Her pain is well controlled.  Reports normal lochia.     The patient plans to breastfeed.    Objective     Vital Signs Range for the last 24 hours  Temperature: Temp:  [98 °F (36.7 °C)-99.1 °F (37.3 °C)] 98.5 °F (36.9 °C)       BP: BP: ()/(37-88) 93/57   Pulse: Heart Rate:  [] 87   Respirations: Resp:  [16-18] 16                       Physical Exam:  General: Awake and alert  Abdomen: Fundus: firm, non tender, 1 below umbilicus  Extremities:  trace edema, NT     Labs:     Results from last 7 days   Lab Units 22  0539 22  0801   WBC 10*3/mm3 10.50 8.01   HEMOGLOBIN g/dL 10.5* 12.6   HEMATOCRIT % 29.5* 36.9   PLATELETS 10*3/mm3 168 176       Prenatal labs results reviewed:  Yes   Rubella:  Immune  Rh Status:    RH type   Date Value Ref Range Status   2022 Positive  Final         Assessment & Plan  : 1. PPD1 S/P  - Doing well, continue usual cares.           AMA (advanced maternal age) multigravida 35+    Pregnant    40 weeks gestation of pregnancy    Mother positive for group B Streptococcus colonization          Meme Mckeon, APRN  2022  09:35 EST    Electronically signed by Becca Russell MD at 22 1608     Ashanti Ritter MD at 22 1623          [facility]  Labor Progress/Update Note    Patient Name: Sabrina Evans  :  1985  MRN:  [id number]      Subjective   To rm for SVE. Patient feeling significant pressure during ctx      Objective     Vital Signs  /88   Pulse 107   Temp 98.1 °F (36.7 °C) (Oral)   Resp 18   Ht 165.1 cm (65\")   Wt 86.6 kg (191 lb)   SpO2 99%   Breastfeeding Yes   BMI 31.78 kg/m²       Physical Exam:  Gen: well appearing, NAD  Chest: normal resp effort  Cardio: Reg rate, well " "perfused  Abdomen: Soft, nontender, gravid  Extremities: No peripheral edema  SVE: 9.5/c/0    FHT:135/mod/+accels/+early decels  [place]: ctx q2-3min  Pit: 12u      Intake/Output Summary (Last 24 hours) at 2022 1623  Last data filed at 2022 1200  Gross per 24 hour   Intake --   Output 450 ml   Net -450 ml           Assessment & Plan      Anticipate       Ashanti Ritter MD  [facility]  2022  16:23 EST    Electronically signed by Ashanti Ritter MD at 22 1627     Ashanti Ritter MD at 22 1354        [facility]  Labor Progress/Update Note    Patient Name: Sabrina Evans  :  1985  MRN:  [id number]      Subjective   To rm for SVE. Comfortable with epidural.       Objective     Vital Signs  /68   Pulse 93   Temp 98.5 °F (36.9 °C) (Oral)   Resp 18   Ht 165.1 cm (65\")   Wt 86.6 kg (191 lb)   SpO2 99%   Breastfeeding Yes   BMI 31.78 kg/m²       Physical Exam:  Gen: well appearing, NAD  Chest: normal resp effort  Cardio: Reg rate, well perfused  Abdomen: Soft, nontender, gravid  Extremities: No peripheral edema  SVE: 4-5/70/-1    FHT: 145/mod/+accels/-decels  [place]: ctx q4min  Pit: 10u    No intake or output data in the 24 hours ending 22 1354        Assessment & Plan      AROM performed, clear fluid noted. Cat 1 tracing. Titrate Pit PRN.      Ashanti Ritter MD  [facility]  2022  13:54 EST    Electronically signed by Ashanti Ritter MD at 22 1358       Consult Notes (last 48 hours)  Notes from 22 through 22   No notes of this type exist for this encounter.         Electronically signed by Maya Loving MA at 22     Johanna Rey RN at 22 0845        This note was copied from a baby's chart.  Mom attempting to sleep at this time, she prefers LC returns later.  Dad with baby at bedside.    Electronically signed by Johanna Rey RN at 22 0941     Lucia Horowitz RN at " 22 0045        Goal Outcome Evaluation:  Plan of Care Reviewed With: patient, spouse        Progress: improving  Outcome Evaluation: VSS, fundus firm, lochia WNL, pain controlled, taking prescribed meds for cough, breastfeeding and bonding with infant    Electronically signed by Lucia Horowitz RN at 22 0045     Ninfa Schmidt RN at 22 0943        This note was copied from a baby's chart.  P2 term baby. Mom reports nipples are sore. Discussed how to achieve deeper latch on the breast vs shallow latch on the nipple. Nipples intact. Mom reports good milk supply with her first baby for the first 6 months then had to start supplementing when starting foods. She has personal pump. Baby has had one wet and 2 stools since midnight. Discussed ways to know baby is getting enough milk and call for any assistance.    Electronically signed by Ninfa Schmidt RN at 22 0945     Meme Mckeon APRN at 22 0830     Attestation signed by Becca Russell MD at 22 1608    I have reviewed this documentation and agree.                  [facility]  Vaginal Delivery Progress Note    Patient Name: Sabrina Evans  :  1985  MRN:  [id number]      Subjective    Postpartum Day 1: Vaginal Delivery of a female infant.     The patient feels well without complaints.  Her pain is well controlled.  Reports normal lochia.     The patient plans to breastfeed.    Objective      Vital Signs Range for the last 24 hours  Temperature: Temp:  [98 °F (36.7 °C)-99.1 °F (37.3 °C)] 98.5 °F (36.9 °C)       BP: BP: ()/(37-88) 93/57   Pulse: Heart Rate:  [] 87   Respirations: Resp:  [16-18] 16                       Physical Exam:  General: Awake and alert  Abdomen: Fundus: firm, non tender, 1 below umbilicus  Extremities:  trace edema, NT     Labs:     Results from last 7 days   Lab Units 22  0539 22  0801   WBC 10*3/mm3 10.50 8.01   HEMOGLOBIN g/dL 10.5* 12.6   HEMATOCRIT %  29.5* 36.9   PLATELETS 10*3/mm3 168 176       Prenatal labs results reviewed:  Yes   Rubella:  Immune  Rh Status:    RH type   Date Value Ref Range Status   2022 Positive  Final         Assessment & Plan  : 1. PPD1 S/P  - Doing well, continue usual cares.           AMA (advanced maternal age) multigravida 35+    Pregnant    40 weeks gestation of pregnancy    Mother positive for group B Streptococcus colonization          Meme Mckeon, APRN  2022  09:35 EST    Electronically signed by Becca Russell MD at 22 1608     Emerald Wright RN at 22 1850          Problem: Adult Inpatient Plan of Care  Goal: Plan of Care Review  Outcome: Ongoing, Progressing  Flowsheets (Taken 2022 1847)  Progress: improving  Plan of Care Reviewed With: patient  Outcome Evaluation: , pain control breast feeding.  Goal: Patient-Specific Goal (Individualized)  Outcome: Ongoing, Progressing  Flowsheets (Taken 2022 1847)  Patient-Specific Goals (Include Timeframe): control bleeding and pain  Individualized Care Needs: healthy baby  Anxieties, Fears or Concerns: epidural before water breaking, pain control post partum  Goal: Absence of Hospital-Acquired Illness or Injury  Outcome: Ongoing, Progressing  Intervention: Identify and Manage Fall Risk  Recent Flowsheet Documentation  Taken 2022 1612 by Emerald Wright RN  Safety Promotion/Fall Prevention:  • nonskid shoes/slippers when out of bed  • room organization consistent  • safety round/check completed  Taken 2022 1524 by Emerald Wright RN  Safety Promotion/Fall Prevention:  • safety round/check completed  • lighting adjusted  Taken 2022 0930 by Emerald Wright RN  Safety Promotion/Fall Prevention:  • nonskid shoes/slippers when out of bed  • room organization consistent  • safety round/check completed  Intervention: Prevent Skin Injury  Recent Flowsheet Documentation  Taken 2022 1800 by Emerald Wright, SANIYA  Body Position:  30 degrees  Taken 11/7/2022 1428 by Emerald Wright RN  Body Position: (side lying release) --  Intervention: Prevent and Manage VTE (Venous Thromboembolism) Risk  Recent Flowsheet Documentation  Taken 11/7/2022 1800 by Emerald Wright RN  Activity Management: bedrest  Intervention: Prevent Infection  Recent Flowsheet Documentation  Taken 11/7/2022 0930 by Emerald Wright RN  Infection Prevention:  • single patient room provided  • rest/sleep promoted  • hand hygiene promoted  Goal: Optimal Comfort and Wellbeing  Outcome: Ongoing, Progressing  Intervention: Provide Person-Centered Care  Recent Flowsheet Documentation  Taken 11/7/2022 1800 by Emerald Wright RN  Trust Relationship/Rapport: care explained  Taken 11/7/2022 1645 by Emerald Wright RN  Trust Relationship/Rapport: reassurance provided  Goal: Readiness for Transition of Care  Outcome: Ongoing, Progressing  Intervention: Mutually Develop Transition Plan  Recent Flowsheet Documentation  Taken 11/7/2022 0850 by Emerald Wright RN  Equipment Needed After Discharge: none  Equipment Currently Used at Home: none  Anticipated Changes Related to Illness: none  Transportation Anticipated: car, drives self  Transportation Concerns: no car  Concerns to be Addressed: no discharge needs identified  Readmission Within the Last 30 Days: no previous admission in last 30 days  Patient/Family Anticipated Services at Transition: none  Patient/Family Anticipates Transition to: home  Taken 11/7/2022 0847 by Emerald Wright RN  Equipment Currently Used at Home: none  Taken 11/7/2022 0834 by Emerald rWight RN  Equipment Currently Used at Home: none     Problem: Bleeding (Labor)  Goal: Hemostasis  Outcome: Ongoing, Progressing     Problem: Change in Fetal Wellbeing (Labor)  Goal: Stable Fetal Wellbeing  Outcome: Ongoing, Progressing  Intervention: Promote and Monitor Fetal Wellbeing  Recent Flowsheet Documentation  Taken 11/7/2022 1800 by Emerald Wright RN  Body  Position: 30 degrees  Taken 11/7/2022 1428 by Emerald Wright RN  Body Position: (side lying release) --     Problem: Delayed Labor Progression (Labor)  Goal: Effective Progression to Delivery  Outcome: Ongoing, Progressing     Problem: Infection (Labor)  Goal: Absence of Infection Signs and Symptoms  Outcome: Ongoing, Progressing  Intervention: Prevent or Manage Infection  Recent Flowsheet Documentation  Taken 11/7/2022 0930 by Emerald Wright RN  Infection Prevention:  • single patient room provided  • rest/sleep promoted  • hand hygiene promoted     Problem: Labor Pain (Labor)  Goal: Acceptable Pain Control  Outcome: Ongoing, Progressing     Problem: Uterine Tachysystole (Labor)  Goal: Normal Uterine Contraction Pattern  Outcome: Ongoing, Progressing     Problem: Device-Related Complication Risk (Anesthesia/Analgesia, Neuraxial)  Goal: Safe Infusion Delivery Completion  Outcome: Ongoing, Progressing     Problem: Infection (Anesthesia/Analgesia, Neuraxial)  Goal: Absence of Infection Signs and Symptoms  Outcome: Ongoing, Progressing  Intervention: Prevent or Manage Infection  Recent Flowsheet Documentation  Taken 11/7/2022 0930 by Emerald Wright RN  Infection Prevention:  • single patient room provided  • rest/sleep promoted  • hand hygiene promoted     Problem: Nausea and Vomiting (Anesthesia/Analgesia, Neuraxial)  Goal: Nausea and Vomiting Relief  Outcome: Ongoing, Progressing     Problem: Pain (Anesthesia/Analgesia, Neuraxial)  Goal: Effective Pain Control  Outcome: Ongoing, Progressing     Problem: Respiratory Compromise (Anesthesia/Analgesia, Neuraxial)  Goal: Effective Oxygenation and Ventilation  Outcome: Ongoing, Progressing     Problem: Sensorimotor Impairment (Anesthesia/Analgesia, Neuraxial)  Goal: Baseline Motor Function  Outcome: Ongoing, Progressing  Intervention: Optimize Sensorimotor Function  Recent Flowsheet Documentation  Taken 11/7/2022 1612 by Emerald Wright RN  Safety Promotion/Fall  Prevention:  • nonskid shoes/slippers when out of bed  • room organization consistent  • safety round/check completed  Taken 2022 1524 by Emerald Wright RN  Safety Promotion/Fall Prevention:  • safety round/check completed  • lighting adjusted  Taken 2022 0930 by Emerald Wright RN  Safety Promotion/Fall Prevention:  • nonskid shoes/slippers when out of bed  • room organization consistent  • safety round/check completed     Problem: Urinary Retention (Anesthesia/Analgesia, Neuraxial)  Goal: Effective Urinary Elimination  Outcome: Ongoing, Progressing     Problem:  Fall Injury Risk  Goal: Absence of Fall, Infant Drop and Related Injury  Outcome: Ongoing, Progressing  Intervention: Promote Injury-Free Environment  Recent Flowsheet Documentation  Taken 2022 1612 by Emerald Wright RN  Safety Promotion/Fall Prevention:  • nonskid shoes/slippers when out of bed  • room organization consistent  • safety round/check completed  Taken 2022 1524 by Emerald Wright RN  Safety Promotion/Fall Prevention:  • safety round/check completed  • lighting adjusted  Taken 2022 0930 by Emerald Wright RN  Safety Promotion/Fall Prevention:  • nonskid shoes/slippers when out of bed  • room organization consistent  • safety round/check completed     Problem: Fall Injury Risk  Goal: Absence of Fall and Fall-Related Injury  Outcome: Ongoing, Progressing  Intervention: Promote Injury-Free Environment  Recent Flowsheet Documentation  Taken 2022 1612 by Emerald Wright RN  Safety Promotion/Fall Prevention:  • nonskid shoes/slippers when out of bed  • room organization consistent  • safety round/check completed  Taken 2022 1524 by Emerald Wright RN  Safety Promotion/Fall Prevention:  • safety round/check completed  • lighting adjusted  Taken 2022 0930 by Emerald Wright RN  Safety Promotion/Fall Prevention:  • nonskid shoes/slippers when out of bed  • room organization consistent  • safety  round/check completed     Problem: Skin Injury Risk Increased  Goal: Skin Health and Integrity  Outcome: Ongoing, Progressing   Goal Outcome Evaluation:  Plan of Care Reviewed With: patient        Progress: improving  Outcome Evaluation: , pain control breast feeding.    Electronically signed by Emerald Wright RN at 22 185     Ashanti Ritter MD at 22 1817          [facility]  Vaginal Delivery Note    Patient Name: Sabrina Evans  :  1985  MRN:  [id number]          AMA (advanced maternal age) multigravida 35+    Pregnant    40 weeks gestation of pregnancy    Mother positive for group B Streptococcus colonization      Labor status: Induction of labor       Delivery     Delivery: Vaginal, Spontaneous     YOB: 2020    Time of Birth: 5:31 PM      Anesthesia: Epidural     Delivering clinician: Ashanti Ritter MD       Infant    Findings: Viable female  infant    Infant observations: Weight: No birth weight on file.   Observations/Comments:  scale 4      Apgars: 9  @ 1 minute /    9  @ 5 minutes     Placenta, Cord, and Fluid    Placenta delivered  Spontaneous   at  2022  5:36 PM     Cord: 3 vessels  present.   Cord blood obtained: Yes    Cord gases obtained:  No      Repair    Episiotomy: No   Lacerations: Small bilateral labial abrasions > reapproximated  Abrasion of the anterior cervix ( 11 o'clock) > silver nitrite applied          Estimated Blood Loss:  Quantitative Blood Loss:    350 mls.  Quantitative Blood Loss (mL): 374 mL         Delivery narrative: The patient is a 37 y.o.  at 40w2d who was admitted on 2022 for induction of labor due to advanced maternal age. Pitocin was started per protocol. Amniotomy was performed with clear fluid noted. The patient entered an active phase of labor and progressed along a normal labor curve to C/C/+1. The fetal heart tracing remained reassuring throughout the labor course.      With good maternal expulsive efforts,  " of a viable female infant occurred. The head delivered atraumatically. The anterior and posterior shoulders delivered without difficulty. No nuchal cord was identified. The body was delivered atraumatically. The infant's nose and oropharynx were suctioned and cleared of secretions. Cord clamping was delayed a minimum of 30 seconds and then was clamped and cut. The infant was placed on the mom's chest for bonding and care. Placenta delivered spontaneously, intact, with 3 vessel cord.    The vagina and perineum were inspected and two small abrasions of the bilateral labial were noted and each separately reapproximated with an interrupted stitch of 3-0 Vicryl. There was also noted to be a small abrasion on the anterior lip if the cervix to which silver nitrite was applied for hemostasis. Rectum found to be intact. Mother and baby recovering in good condition.       Ashanti Ritter MD  [facility]   22  18:17 EST        Electronically signed by Ashanti Ritter MD at 22 1820     Ashanti Ritter MD at 22 1623          [facility]  Labor Progress/Update Note    Patient Name: Sabrina Evans  :  1985  MRN:  [id number]      Subjective    To rm for SVE. Patient feeling significant pressure during ctx      Objective      Vital Signs  /88   Pulse 107   Temp 98.1 °F (36.7 °C) (Oral)   Resp 18   Ht 165.1 cm (65\")   Wt 86.6 kg (191 lb)   SpO2 99%   Breastfeeding Yes   BMI 31.78 kg/m²       Physical Exam:  Gen: well appearing, NAD  Chest: normal resp effort  Cardio: Reg rate, well perfused  Abdomen: Soft, nontender, gravid  Extremities: No peripheral edema  SVE: 9.5/c/0    FHT:135/mod/+accels/+early decels  [place]: ctx q2-3min  Pit: 12u      Intake/Output Summary (Last 24 hours) at 2022 1623  Last data filed at 2022 1200  Gross per 24 hour   Intake --   Output 450 ml   Net -450 ml           Assessment & Plan      Anticipate       Ashanti Ritter MD  Women " "[facility]  2022  16:23 EST    Electronically signed by Ashanti Ritter MD at 22 1627     Ashanti Ritter MD at 22 1354        [facility]  Labor Progress/Update Note    Patient Name: Sabrina Evans  :  1985  MRN:  [id number]      Subjective    To rm for SVE. Comfortable with epidural.       Objective      Vital Signs  /68   Pulse 93   Temp 98.5 °F (36.9 °C) (Oral)   Resp 18   Ht 165.1 cm (65\")   Wt 86.6 kg (191 lb)   SpO2 99%   Breastfeeding Yes   BMI 31.78 kg/m²       Physical Exam:  Gen: well appearing, NAD  Chest: normal resp effort  Cardio: Reg rate, well perfused  Abdomen: Soft, nontender, gravid  Extremities: No peripheral edema  SVE: 4-5/70/-1    FHT: 145/mod/+accels/-decels  [place]: ctx q4min  Pit: 10u    No intake or output data in the 24 hours ending 22 1354        Assessment & Plan      AROM performed, clear fluid noted. Cat 1 tracing. Titrate Pit PRN.      Ashanti Ritter MD  [facility]  2022  13:54 EST    Electronically signed by Ashanti Ritter MD at 22 1358     Ashanti Ritter MD at 22 1139          [facility]- Labor & Delivery History and Physical      Patient Name: Sabrina Evans  YOB: 1985  MRN: [id number]      Chief Complaint   Patient presents with   • Scheduled Induction       Subjective      Patient is a 37 y.o. female  currently at 40w2d, who presents for induction of labor due to advanced maternal age. Intermittently perceiving strong contractions, currently on 8u of Pitocin. Denies LOF, VB. Endorses FM    Her prenatal care is complicated by:  - Advanced Maternal Age  - Group B Strep Positive    The following portions of the patients history were reviewed and updated as appropriate: current medications, allergies, past medical history and problem list .       Prenatal Information:  External Prenatal Results     Pregnancy Outside Results - Transcribed From Office " Records - See Scanned Records For Details     Test Value Date Time    ABO  A  22 0801    Rh  Positive  22 0801    Antibody Screen  Negative  22 0801      ^ Negative  22     Varicella IgG       Rubella ^ Immune  22     Hgb  12.6 g/dL 22 0801       11.0 g/dL 22 1426    Hct  36.9 % 22 0801       31.9 % 22 1426    Glucose Fasting GTT       Glucose Tolerance Test 1 hour       Glucose Tolerance Test 3 hour       Gonorrhea (discrete)       Chlamydia (discrete)       RPR ^ Non-Reactive  22     VDRL       Syphilis Antibody       HBsAg ^ Negative  22     Herpes Simplex Virus PCR       Herpes Simplex VIrus Culture       HIV ^ Negative  22     Hep C RNA Quant PCR       Hep C Antibody ^ Neg  22     AFP       Group B Strep ^ POS  10/12/22     GBS Susceptibility to Clindamycin       GBS Susceptibility to Erythromycin       Fetal Fibronectin       Genetic Testing, Maternal Blood             Drug Screening     Test Value Date Time    Urine Drug Screen       Amphetamine Screen       Barbiturate Screen       Benzodiazepine Screen       Methadone Screen       Phencyclidine Screen       Opiates Screen       THC Screen       Cocaine Screen       Propoxyphene Screen       Buprenorphine Screen       Methamphetamine Screen       Oxycodone Screen       Tricyclic Antidepressants Screen             Legend    ^: Historical                         Past OB History:     OB History    Para Term  AB Living   4 1 1 0 2 1   SAB IAB Ectopic Molar Multiple Live Births   2 0 0 0 0 1      # Outcome Date GA Lbr Stiven/2nd Weight Sex Delivery Anes PTL Lv   4 Current            3 SAB 2021 7w3d    SAB      2 Term 17 41w1d 15:50 / 00:53 3768 g (8 lb 4.9 oz) F Vag-Spont EPI N AMBROCIO      Birth Comments: scale #1      Name: ROSE MARIE HASSAN      Apgar1: 9  Apgar5: 9   1 SAB                Past Medical History: Past Medical History:   Diagnosis Date   • Asthma    •  Kidney stones       Past Surgical History History reviewed. No pertinent surgical history.   Family History: Family History   Problem Relation Age of Onset   • Hypertension Mother    • No Known Problems Father    • No Known Problems Sister    • No Known Problems Brother    • No Known Problems Son    • No Known Problems Daughter    • No Known Problems Maternal Grandmother    • No Known Problems Maternal Grandfather    • No Known Problems Paternal Grandmother    • No Known Problems Paternal Grandfather    • No Known Problems Cousin       Social History:  reports that she has never smoked. She has never used smokeless tobacco.   reports no history of alcohol use.   reports no history of drug use.        General ROS:   Review of Systems   Constitutional: Negative for chills and fever.   Respiratory: Negative for cough and shortness of breath.    Cardiovascular: Negative for chest pain and palpitations.   Gastrointestinal: Negative for abdominal pain, constipation, diarrhea, nausea and vomiting.   Genitourinary: Negative for dysuria.        Objective        Vital Signs Range for the last 24 hours  Temperature: Temp:  [98.3 °F (36.8 °C)] 98.3 °F (36.8 °C)   Temp Source: Temp src: Oral   BP: BP: ()/(60-77) 117/70   Pulse: Heart Rate:  [] 93   Respirations: Resp:  [18] 18   SPO2:     O2 Amount (l/min):     O2 Devices     Weight: Weight:  [85.7 kg (189 lb)-86.6 kg (191 lb)] 86.6 kg (191 lb)       Labs on Admission:  Results from last 7 days   Lab Units 11/07/22  0801   WBC 10*3/mm3 8.01   HEMOGLOBIN g/dL 12.6   HEMATOCRIT % 36.9   PLATELETS 10*3/mm3 176           Physical Examination:   Physical Exam  Vitals reviewed.   Constitutional:       General: She is not in acute distress.     Appearance: Normal appearance.   HENT:      Head: Normocephalic and atraumatic.   Eyes:      Extraocular Movements: Extraocular movements intact.      Pupils: Pupils are equal, round, and reactive to light.   Cardiovascular:       Rate and Rhythm: Normal rate and regular rhythm.      Pulses: Normal pulses.      Heart sounds: Normal heart sounds.   Pulmonary:      Effort: Pulmonary effort is normal.      Breath sounds: Normal breath sounds.   Abdominal:      Tenderness: There is no abdominal tenderness. There is no guarding.      Comments: Gravid; Fundal height appropriate for GA   Skin:     General: Skin is warm and dry.   Neurological:      General: No focal deficit present.      Mental Status: She is alert and oriented to person, place, and time.   Psychiatric:         Mood and Affect: Mood normal.          Presentation: Vertex   Cervix: Exam by: Method: sterile exam per physician   Dilation: Cervical Dilation (cm): 3-4   Effacement: Cervical Effacement: 70%   Station: Fetal Station: 1-->-2       Fetal Heart Rate Assessment   Method: Fetal HR Assessment Method: external   Beats/min: Fetal HR (beats/min): 140   Baseline: Fetal HR Baseline: normal range   Variability: Fetal HR Variability: moderate (amplitude range 6 to 25 bpm)   Accels: Fetal HR Accelerations: greater than/equal to 15 bpm, lasting at least 15 seconds   Decels: Fetal HR Decelerations: absent   Tracing Category:       Uterine Assessment   Method: Method: palpation, per patient report, external tocotransducer   Frequency (min): Contraction Frequency (Minutes): 4-6   Ctx Count in 10 min:     Duration:     Intensity: Contraction Intensity: mild by palpation   Intensity by IUPC:     Resting Tone: Uterine Resting Tone: soft by palpation   Resting Tone by IUPC:     Council Units:         Assessment & Plan       Pregnant    40 weeks gestation of pregnancy    AMA (advanced maternal age) multigravida 35+    Mother positive for group B Streptococcus colonization        1.  Intrauterine pregnancy at 40w2d weeks gestation undergoing IOL for AMA.  Reassuring fetal status.   Continue pitocin, plan for AROM after epidural. Anticipate .  Plan of care has been reviewed with patient and  family.  All questions answered.    2. GBS Positive > Intrapartum PCN ordered      Ashanti Ritter MD  [facility]  2022  11:49 EST      Electronically signed by Ashanti Ritter MD at 22 1152         Facility-Administered Medications as of 2022   Medication Dose Route Frequency Provider Last Rate Last Admin   • [COMPLETED] ibuprofen (ADVIL,MOTRIN) tablet 800 mg  800 mg Oral Once Yu Singleton MD   800 mg at 22   • [COMPLETED] lactated ringers bolus 1,000 mL  1,000 mL Intravenous Once PRN Yvette Thorpe MD   Stopped at 22 1237   • [COMPLETED] oxytocin (PITOCIN) 30 units in 0.9% sodium chloride 500 mL (premix)  999 mL/hr Intravenous Once Yvette Thorpe  mL/hr at 22 1737 999 mL/hr at 22 1737    Followed by   • [] oxytocin (PITOCIN) 30 units in 0.9% sodium chloride 500 mL (premix)  250 mL/hr Intravenous Continuous Yvette Thorpe  mL/hr at 22 1836 250 mL/hr at 22 1836   • [COMPLETED] penicillin G potassium 5 Million Units in sodium chloride 0.9 % 100 mL IVPB-VTB  5 Million Units Intravenous Once Yvette Thorpe MD   Stopped at 22 0920     Lab Results (last 72 hours)     Procedure Component Value Units Date/Time    CBC & Differential [[id number]]  (Abnormal) Collected: 22    Specimen: Blood Updated: 22    Narrative:      The following orders were created for panel order CBC & Differential.  Procedure                               Abnormality         Status                     ---------                               -----------         ------                     CBC Auto Differential[[id number]]        Abnormal            Final result                 Please view results for these tests on the individual orders.    CBC Auto Differential [[id number]]  (Abnormal) Collected: 22    Specimen: Blood Updated: 22     WBC 10.50 10*3/mm3      RBC 3.38 10*6/mm3      Hemoglobin 10.5 g/dL       Hematocrit 29.5 %      MCV 87.3 fL      MCH 31.1 pg      MCHC 35.6 g/dL      RDW 12.7 %      RDW-SD 40.8 fl      MPV 9.0 fL      Platelets 168 10*3/mm3      Neutrophil % 69.3 %      Lymphocyte % 21.7 %      Monocyte % 6.5 %      Eosinophil % 1.0 %      Basophil % 0.3 %      Immature Grans % 1.2 %      Neutrophils, Absolute 7.27 10*3/mm3      Lymphocytes, Absolute 2.28 10*3/mm3      Monocytes, Absolute 0.68 10*3/mm3      Eosinophils, Absolute 0.11 10*3/mm3      Basophils, Absolute 0.03 10*3/mm3      Immature Grans, Absolute 0.13 10*3/mm3      nRBC 0.0 /100 WBC     COVID PRE-OP / PRE-PROCEDURE SCREENING ORDER (NO ISOLATION) - Swab, Nasopharynx [[id number]]  (Normal) Collected: 11/07/22 0831    Specimen: Swab from Nasopharynx Updated: 11/07/22 0918    Narrative:      The following orders were created for panel order COVID PRE-OP / PRE-PROCEDURE SCREENING ORDER (NO ISOLATION) - Swab, Nasopharynx.  Procedure                               Abnormality         Status                     ---------                               -----------         ------                     COVID-19,[facility] IN-HOUSE...[[id number]]  Normal              Final result                 Please view results for these tests on the individual orders.    COVID-19,[facility] IN-HOUSE CEPHEID/NEVA NP SWAB IN TRANSPORT MEDIA 8-12 HR TAT - Swab, Nasopharynx [[id number]]  (Normal) Collected: 11/07/22 0831    Specimen: Swab from Nasopharynx Updated: 11/07/22 0918     COVID19 Not Detected    Narrative:      Fact sheet for providers: https://www.fda.gov/media/875591/download    Fact sheet for patients: https://www.fda.gov/media/334927/download    Test performed by PCR.    CBC (No Diff) [[id number]]  (Normal) Collected: 11/07/22 0801    Specimen: Blood Updated: 11/07/22 0815     WBC 8.01 10*3/mm3      RBC 4.08 10*6/mm3      Hemoglobin 12.6 g/dL      Hematocrit 36.9 %      MCV 90.4 fL      MCH 30.9 pg      MCHC 34.1 g/dL      RDW 13.2 %      RDW-SD 43.4 fl      MPV 9.0  fL      Platelets 176 10*3/mm3     Hepatitis C Antibody [[id number]] Resulted: 04/22/22    Specimen: Blood Updated: 11/07/22 0754     External Hepatitis C Ab Neg    Hepatitis B Surface Antigen [[id number]] Resulted: 04/22/22    Specimen: Blood Updated: 11/07/22 0754     External Hepatitis B Surface Ag Negative    RPR [[id number]] Resulted: 04/22/22    Specimen: Blood Updated: 11/07/22 0754     External RPR Non-Reactive    Rubella Antibody, IgG [[id number]] Resulted: 04/22/22    Specimen: Blood Updated: 11/07/22 0754     External Rubella Qual Immune    HIV-1 Antibody, EIA [[id number]] Resulted: 04/22/22    Specimen: Blood Updated: 11/07/22 0754     External HIV Antibody Negative    Group B Streptococcus Culture - Swab, Vaginal/Rectum [[id number]] Resulted: 10/12/22    Specimen: Swab from Vaginal/Rectum Updated: 11/07/22 0754     External Strep Group B Ag POS          Imaging Results (Last 72 Hours)     ** No results found for the last 72 hours. **        ECG/EMG Results (last 72 hours)     ** No results found for the last 72 hours. **        Orders (last 72 hrs)      Start     Ordered    11/09/22 0906  Discharge patient  Once         11/09/22 0906 11/09/22 0000  ibuprofen (ADVIL,MOTRIN) 600 MG tablet  Every 6 Hours Scheduled         11/09/22 0906 11/09/22 0000  benzonatate (TESSALON) 100 MG capsule  3 Times Daily PRN         11/09/22 0906 11/08/22 0900  prenatal vitamin tablet 1 tablet  Daily,   Status:  Discontinued         11/07/22 2037 11/08/22 0130  guaiFENesin (MUCINEX) 12 hr tablet 600 mg  Every 12 Hours Scheduled,   Status:  Discontinued         11/08/22 0030    11/08/22 0029  benzonatate (TESSALON) capsule 100 mg  3 Times Daily PRN,   Status:  Discontinued         11/08/22 0030 11/08/22 0000  ibuprofen (ADVIL,MOTRIN) tablet 600 mg  Every 6 Hours Scheduled,   Status:  Discontinued         11/07/22 2037 11/08/22 0000  bisacodyl (DULCOLAX) suppository 10 mg  Daily PRN,   Status:  Discontinued          11/07/22 2037 11/07/22 2130  docusate sodium (COLACE) capsule 100 mg  2 Times Daily,   Status:  Discontinued         11/07/22 2037 11/07/22 2130  acetaminophen (TYLENOL) tablet 500 mg  Every 6 Hours,   Status:  Discontinued         11/07/22 2037 11/07/22 2038  Fundal & Lochia Check  Every Shift,   Status:  Canceled       11/07/22 2037 11/07/22 2038  Ambulate Patient  Every Shift,   Status:  Canceled       11/07/22 2037 11/07/22 2037  Apply witch hazel pads / TUCKS to perineum as needed for comfort PRN  As Needed,   Status:  Canceled       11/07/22 2037 11/07/22 2037  Warm compress  As Needed,   Status:  Canceled       11/07/22 2037 11/07/22 2037  Apply ace wrap, tight bra, or binder  As Needed,   Status:  Canceled       11/07/22 2037 11/07/22 2037  Apply ice packs  As Needed,   Status:  Canceled       11/07/22 2037 11/07/22 2037  sodium chloride 0.9 % flush 1-10 mL  As Needed,   Status:  Discontinued         11/07/22 2037 11/07/22 2037  oxyCODONE (ROXICODONE) immediate release tablet 5 mg  Every 4 Hours PRN,   Status:  Discontinued         11/07/22 2037 11/07/22 2037  diphenhydrAMINE (BENADRYL) capsule 25 mg  Nightly PRN,   Status:  Discontinued         11/07/22 2037 11/07/22 2037  magnesium hydroxide (MILK OF MAGNESIA) suspension 10 mL  Daily PRN,   Status:  Discontinued         11/07/22 2037 11/07/22 2037  lanolin topical 1 application  Every 1 Hour PRN,   Status:  Discontinued         11/07/22 2037 11/07/22 2037  all purpose nipple ointment 1 application  4 Times Daily PRN,   Status:  Discontinued         11/07/22 2037 11/07/22 2037  benzocaine-menthol (DERMOPLAST) 20-0.5 % topical spray  As Needed,   Status:  Discontinued         11/07/22 2037 11/07/22 2037  Hydrocort-Pramoxine (Perianal) (PROCTOFOAM-HS) 1-1 % rectal foam 1 application  As Needed,   Status:  Discontinued         11/07/22 2037 11/07/22 2037  Hydrocortisone (Perianal) (ANUSOL-HC) 2.5 % rectal  cream 1 application  As Needed,   Status:  Discontinued         22  benzocaine (AMERICAINE) 20 % rectal ointment 1 application  As Needed,   Status:  Discontinued         22  ondansetron (ZOFRAN) tablet 4 mg  Every 8 Hours PRN,   Status:  Discontinued         22  calcium carbonate (TUMS) chewable tablet 500 mg (200 mg elemental)  3 Times Daily PRN,   Status:  Discontinued         22 0000  Group B Streptococcus Culture - Swab, Vaginal/Rectum        Comments: This is an external result entered through the Results Console.      22 0000  Antibody Screen        Comments: This is an external result entered through the Results Console.      22 0000  ABO / Rh        Comments: This is an external result entered through the Results Console.      22 0000  Hepatitis C Antibody        Comments: This is an external result entered through the Results Console.      22 0000  Hepatitis B Surface Antigen        Comments: This is an external result entered through the Results Console.      22 0000  RPR        Comments: This is an external result entered through the Results Console.      22 0000  Rubella Antibody, IgG        Comments: This is an external result entered through the Results Console.      22 0000  HIV-1 Antibody, EIA        Comments: This is an external result entered through the Results Console.      22                   Operative/Procedure Notes (last 7 days)      Ashanti Ritter MD at 22 181          [facility]  Vaginal Delivery Note    Patient Name: Sabrina Evans  :  1985  MRN:  [id number]          AMA (advanced maternal age) multigravida 35+    Pregnant    40 weeks gestation of pregnancy    Mother positive for group B  Streptococcus colonization      Labor status: Induction of labor       Delivery     Delivery: Vaginal, Spontaneous     YOB: 2020    Time of Birth: 5:31 PM      Anesthesia: Epidural     Delivering clinician: Ashanti Ritter MD       Infant    Findings: Viable female  infant    Infant observations: Weight: No birth weight on file.   Observations/Comments:  scale 4      Apgars: 9  @ 1 minute /    9  @ 5 minutes     Placenta, Cord, and Fluid    Placenta delivered  Spontaneous   at  2022  5:36 PM     Cord: 3 vessels  present.   Cord blood obtained: Yes    Cord gases obtained:  No      Repair    Episiotomy: No   Lacerations: Small bilateral labial abrasions > reapproximated  Abrasion of the anterior cervix ( 11 o'clock) > silver nitrite applied          Estimated Blood Loss:  Quantitative Blood Loss:    350 mls.  Quantitative Blood Loss (mL): 374 mL         Delivery narrative: The patient is a 37 y.o.  at 40w2d who was admitted on 2022 for induction of labor due to advanced maternal age. Pitocin was started per protocol. Amniotomy was performed with clear fluid noted. The patient entered an active phase of labor and progressed along a normal labor curve to C/C/+1. The fetal heart tracing remained reassuring throughout the labor course.      With good maternal expulsive efforts,  of a viable female infant occurred. The head delivered atraumatically. The anterior and posterior shoulders delivered without difficulty. No nuchal cord was identified. The body was delivered atraumatically. The infant's nose and oropharynx were suctioned and cleared of secretions. Cord clamping was delayed a minimum of 30 seconds and then was clamped and cut. The infant was placed on the mom's chest for bonding and care. Placenta delivered spontaneously, intact, with 3 vessel cord.    The vagina and perineum were inspected and two small abrasions of the bilateral labial were noted and each separately  reapproximated with an interrupted stitch of 3-0 Vicryl. There was also noted to be a small abrasion on the anterior lip if the cervix to which silver nitrite was applied for hemostasis. Rectum found to be intact. Mother and baby recovering in good condition.       Ashanti Ritter MD  [facility]   11/07/22  18:17 EST        Electronically signed by Ashanti Ritter MD at 11/07/22 1820       Physician Progress Notes (last 72 hours)  Notes from 11/08/22 1626 through 11/11/22 1626   No notes of this type exist for this encounter.         Consult Notes (last 72 hours)  Notes from 11/08/22 1626 through 11/11/22 1626   No notes of this type exist for this encounter.